# Patient Record
Sex: FEMALE | Race: WHITE | NOT HISPANIC OR LATINO | Employment: FULL TIME | URBAN - METROPOLITAN AREA
[De-identification: names, ages, dates, MRNs, and addresses within clinical notes are randomized per-mention and may not be internally consistent; named-entity substitution may affect disease eponyms.]

---

## 2023-09-18 PROBLEM — F41.8 DEPRESSION WITH ANXIETY: Status: ACTIVE | Noted: 2023-09-18

## 2023-09-18 PROBLEM — N80.9 ENDOMETRIOSIS: Status: ACTIVE | Noted: 2023-09-18

## 2023-09-18 PROBLEM — L28.2 PRURITIC RASH: Status: ACTIVE | Noted: 2023-09-18

## 2023-09-18 PROBLEM — R55 VASOVAGAL EPISODE: Status: ACTIVE | Noted: 2023-09-18

## 2023-09-18 PROBLEM — N12 PYELONEPHRITIS: Status: ACTIVE | Noted: 2023-09-18

## 2023-09-18 PROBLEM — N91.5 OLIGOMENORRHEA: Status: ACTIVE | Noted: 2023-09-18

## 2023-09-18 PROBLEM — T14.90XA TRAUMA: Status: ACTIVE | Noted: 2023-09-18

## 2023-09-18 RX ORDER — IBUPROFEN 600 MG/1
TABLET ORAL EVERY 6 HOURS PRN
COMMUNITY
Start: 2022-07-16 | End: 2023-10-26 | Stop reason: WASHOUT

## 2023-09-18 RX ORDER — SERTRALINE HYDROCHLORIDE 50 MG/1
1 TABLET, FILM COATED ORAL DAILY
COMMUNITY
Start: 2022-08-04 | End: 2023-10-26 | Stop reason: WASHOUT

## 2023-09-18 RX ORDER — SULFAMETHOXAZOLE AND TRIMETHOPRIM 800; 160 MG/1; MG/1
TABLET ORAL 2 TIMES DAILY
COMMUNITY
Start: 2022-08-22 | End: 2023-10-26 | Stop reason: WASHOUT

## 2023-09-18 RX ORDER — DROSPIRENONE 4 MG/1
TABLET, FILM COATED ORAL
COMMUNITY
Start: 2022-07-16 | End: 2023-10-26 | Stop reason: ALTCHOICE

## 2023-09-18 RX ORDER — DOCUSATE SODIUM 100 MG/1
CAPSULE, LIQUID FILLED ORAL 2 TIMES DAILY PRN
COMMUNITY
Start: 2022-07-16 | End: 2023-10-26 | Stop reason: WASHOUT

## 2023-09-18 RX ORDER — DROSPIRENONE 4 MG/1
TABLET, FILM COATED ORAL
COMMUNITY
Start: 2022-09-14 | End: 2023-10-26 | Stop reason: ALTCHOICE

## 2023-09-18 RX ORDER — ACETAMINOPHEN 325 MG/1
TABLET ORAL EVERY 6 HOURS PRN
COMMUNITY
Start: 2022-07-16 | End: 2023-10-26 | Stop reason: WASHOUT

## 2023-10-26 ENCOUNTER — APPOINTMENT (OUTPATIENT)
Dept: OBSTETRICS AND GYNECOLOGY | Facility: CLINIC | Age: 33
End: 2023-10-26
Payer: COMMERCIAL

## 2023-10-26 ENCOUNTER — OFFICE VISIT (OUTPATIENT)
Dept: OBSTETRICS AND GYNECOLOGY | Facility: CLINIC | Age: 33
End: 2023-10-26
Payer: COMMERCIAL

## 2023-10-26 VITALS
WEIGHT: 156 LBS | BODY MASS INDEX: 25.99 KG/M2 | DIASTOLIC BLOOD PRESSURE: 70 MMHG | SYSTOLIC BLOOD PRESSURE: 118 MMHG | HEIGHT: 65 IN

## 2023-10-26 DIAGNOSIS — Z01.419 WELL WOMAN EXAM WITH ROUTINE GYNECOLOGICAL EXAM: Primary | ICD-10-CM

## 2023-10-26 DIAGNOSIS — N93.9 ABNORMAL UTERINE BLEEDING (AUB): ICD-10-CM

## 2023-10-26 PROCEDURE — 1036F TOBACCO NON-USER: CPT | Performed by: OBSTETRICS & GYNECOLOGY

## 2023-10-26 PROCEDURE — 99395 PREV VISIT EST AGE 18-39: CPT | Performed by: OBSTETRICS & GYNECOLOGY

## 2023-10-26 RX ORDER — SERTRALINE HYDROCHLORIDE 100 MG/1
100 TABLET, FILM COATED ORAL DAILY
COMMUNITY
Start: 2023-10-03 | End: 2023-11-07

## 2023-10-26 RX ORDER — DROSPIRENONE 4 MG/1
TABLET, FILM COATED ORAL
Qty: 66 TABLET | Refills: 4 | Status: SHIPPED | OUTPATIENT
Start: 2023-10-26

## 2023-10-26 ASSESSMENT — PAIN SCALES - GENERAL: PAINLEVEL: 0-NO PAIN
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
## 2023-11-06 ENCOUNTER — APPOINTMENT (OUTPATIENT)
Dept: RADIOLOGY | Facility: HOSPITAL | Age: 33
End: 2023-11-06
Payer: COMMERCIAL

## 2023-11-06 ENCOUNTER — HOSPITAL ENCOUNTER (EMERGENCY)
Facility: HOSPITAL | Age: 33
Discharge: HOME | End: 2023-11-06
Attending: INTERNAL MEDICINE
Payer: COMMERCIAL

## 2023-11-06 VITALS
OXYGEN SATURATION: 99 % | HEART RATE: 83 BPM | BODY MASS INDEX: 23.95 KG/M2 | DIASTOLIC BLOOD PRESSURE: 85 MMHG | SYSTOLIC BLOOD PRESSURE: 116 MMHG | TEMPERATURE: 97.6 F | RESPIRATION RATE: 19 BRPM | HEIGHT: 66 IN | WEIGHT: 149 LBS

## 2023-11-06 DIAGNOSIS — G43.109 MIGRAINE WITH AURA AND WITHOUT STATUS MIGRAINOSUS, NOT INTRACTABLE: ICD-10-CM

## 2023-11-06 DIAGNOSIS — G45.9 TIA (TRANSIENT ISCHEMIC ATTACK): Primary | ICD-10-CM

## 2023-11-06 LAB
ALBUMIN SERPL BCP-MCNC: 4.4 G/DL (ref 3.4–5)
ALP SERPL-CCNC: 54 U/L (ref 33–110)
ALT SERPL W P-5'-P-CCNC: 13 U/L (ref 7–45)
ANION GAP SERPL CALC-SCNC: 14 MMOL/L (ref 10–20)
APTT PPP: 31 SECONDS (ref 27–38)
AST SERPL W P-5'-P-CCNC: 15 U/L (ref 9–39)
BASOPHILS # BLD AUTO: 0.05 X10*3/UL (ref 0–0.1)
BASOPHILS NFR BLD AUTO: 0.6 %
BILIRUB SERPL-MCNC: 0.4 MG/DL (ref 0–1.2)
BUN SERPL-MCNC: 12 MG/DL (ref 6–23)
CALCIUM SERPL-MCNC: 9.7 MG/DL (ref 8.6–10.3)
CARDIAC TROPONIN I PNL SERPL HS: <3 NG/L (ref 0–13)
CHLORIDE SERPL-SCNC: 106 MMOL/L (ref 98–107)
CO2 SERPL-SCNC: 24 MMOL/L (ref 21–32)
CREAT SERPL-MCNC: 0.97 MG/DL (ref 0.5–1.05)
EOSINOPHIL # BLD AUTO: 0.11 X10*3/UL (ref 0–0.7)
EOSINOPHIL NFR BLD AUTO: 1.3 %
ERYTHROCYTE [DISTWIDTH] IN BLOOD BY AUTOMATED COUNT: 12.9 % (ref 11.5–14.5)
GFR SERPL CREATININE-BSD FRML MDRD: 79 ML/MIN/1.73M*2
GLUCOSE BLD MANUAL STRIP-MCNC: 108 MG/DL (ref 74–99)
GLUCOSE BLD MANUAL STRIP-MCNC: 72 MG/DL (ref 74–99)
GLUCOSE SERPL-MCNC: 83 MG/DL (ref 74–99)
HCT VFR BLD AUTO: 46.1 % (ref 36–46)
HGB BLD-MCNC: 14.9 G/DL (ref 12–16)
IMM GRANULOCYTES # BLD AUTO: 0.01 X10*3/UL (ref 0–0.7)
IMM GRANULOCYTES NFR BLD AUTO: 0.1 % (ref 0–0.9)
INR PPP: 1.1 (ref 0.9–1.1)
LYMPHOCYTES # BLD AUTO: 3.94 X10*3/UL (ref 1.2–4.8)
LYMPHOCYTES NFR BLD AUTO: 47.4 %
MCH RBC QN AUTO: 27.4 PG (ref 26–34)
MCHC RBC AUTO-ENTMCNC: 32.3 G/DL (ref 32–36)
MCV RBC AUTO: 85 FL (ref 80–100)
MONOCYTES # BLD AUTO: 0.47 X10*3/UL (ref 0.1–1)
MONOCYTES NFR BLD AUTO: 5.6 %
NEUTROPHILS # BLD AUTO: 3.74 X10*3/UL (ref 1.2–7.7)
NEUTROPHILS NFR BLD AUTO: 45 %
NRBC BLD-RTO: 0 /100 WBCS (ref 0–0)
PLATELET # BLD AUTO: 239 X10*3/UL (ref 150–450)
POCT GLUCOSE: 72 MG/DL (ref 74–99)
POTASSIUM SERPL-SCNC: 3.8 MMOL/L (ref 3.5–5.3)
PROT SERPL-MCNC: 7.7 G/DL (ref 6.4–8.2)
PROTHROMBIN TIME: 12 SECONDS (ref 9.8–12.8)
RBC # BLD AUTO: 5.43 X10*6/UL (ref 4–5.2)
SODIUM SERPL-SCNC: 140 MMOL/L (ref 136–145)
WBC # BLD AUTO: 8.3 X10*3/UL (ref 4.4–11.3)

## 2023-11-06 PROCEDURE — 70498 CT ANGIOGRAPHY NECK: CPT | Performed by: RADIOLOGY

## 2023-11-06 PROCEDURE — 84484 ASSAY OF TROPONIN QUANT: CPT | Performed by: INTERNAL MEDICINE

## 2023-11-06 PROCEDURE — 99285 EMERGENCY DEPT VISIT HI MDM: CPT | Mod: 25 | Performed by: INTERNAL MEDICINE

## 2023-11-06 PROCEDURE — 85730 THROMBOPLASTIN TIME PARTIAL: CPT | Performed by: INTERNAL MEDICINE

## 2023-11-06 PROCEDURE — 80053 COMPREHEN METABOLIC PANEL: CPT | Performed by: INTERNAL MEDICINE

## 2023-11-06 PROCEDURE — 2550000001 HC RX 255 CONTRASTS: Performed by: INTERNAL MEDICINE

## 2023-11-06 PROCEDURE — 70498 CT ANGIOGRAPHY NECK: CPT

## 2023-11-06 PROCEDURE — 70496 CT ANGIOGRAPHY HEAD: CPT | Performed by: RADIOLOGY

## 2023-11-06 PROCEDURE — 36415 COLL VENOUS BLD VENIPUNCTURE: CPT | Performed by: INTERNAL MEDICINE

## 2023-11-06 PROCEDURE — 70450 CT HEAD/BRAIN W/O DYE: CPT

## 2023-11-06 PROCEDURE — 85610 PROTHROMBIN TIME: CPT | Performed by: INTERNAL MEDICINE

## 2023-11-06 PROCEDURE — 85025 COMPLETE CBC W/AUTO DIFF WBC: CPT | Performed by: INTERNAL MEDICINE

## 2023-11-06 PROCEDURE — 70496 CT ANGIOGRAPHY HEAD: CPT

## 2023-11-06 PROCEDURE — 82947 ASSAY GLUCOSE BLOOD QUANT: CPT | Mod: 59

## 2023-11-06 RX ORDER — NAPROXEN SODIUM 220 MG/1
81 TABLET, FILM COATED ORAL DAILY
Qty: 360 TABLET | Refills: 0 | Status: SHIPPED | OUTPATIENT
Start: 2023-11-06 | End: 2023-11-06 | Stop reason: SDUPTHER

## 2023-11-06 RX ORDER — NAPROXEN SODIUM 220 MG/1
81 TABLET, FILM COATED ORAL DAILY
Qty: 360 TABLET | Refills: 0 | Status: SHIPPED | OUTPATIENT
Start: 2023-11-06 | End: 2024-02-21 | Stop reason: WASHOUT

## 2023-11-06 RX ADMIN — IOHEXOL 75 ML: 350 INJECTION, SOLUTION INTRAVENOUS at 21:17

## 2023-11-06 ASSESSMENT — ABCD2 SCORE
HISTORY OF DIABETES: NO
BP IS HIGHER THAN 140/90 MMHG: NO
ABCD2 SCORE: 3
CLINICAL FEATURES OF THE TIA: UNILATERAL WEAKNESS
AGE IS GREATER THAN OR EQUAL TO 60: NO
DURATION OF SYMPTOMS: 10-59

## 2023-11-06 ASSESSMENT — COLUMBIA-SUICIDE SEVERITY RATING SCALE - C-SSRS
6. HAVE YOU EVER DONE ANYTHING, STARTED TO DO ANYTHING, OR PREPARED TO DO ANYTHING TO END YOUR LIFE?: NO
2. HAVE YOU ACTUALLY HAD ANY THOUGHTS OF KILLING YOURSELF?: NO
1. IN THE PAST MONTH, HAVE YOU WISHED YOU WERE DEAD OR WISHED YOU COULD GO TO SLEEP AND NOT WAKE UP?: NO

## 2023-11-06 ASSESSMENT — PAIN SCALES - GENERAL
PAINLEVEL_OUTOF10: 0 - NO PAIN
PAINLEVEL_OUTOF10: 2

## 2023-11-06 ASSESSMENT — PAIN - FUNCTIONAL ASSESSMENT: PAIN_FUNCTIONAL_ASSESSMENT: 0-10

## 2023-11-07 NOTE — ED TRIAGE NOTES
Patient ambulatory to ED with complaint of headache that started around 1200 today. Patient states she developed slurred speech/aphasia, left sided blurred vision and left sided numbness that lasted for approx 15 minutes and a second episode that lasted around 5 minutes at urgent care that has all since resolved. Denies migraine/stroke hx. States headache was resolved with Advil and Tylenol.

## 2023-11-07 NOTE — ED PROVIDER NOTES
HPI     CC: Headache (resolved) and Numbness (resolved)     Last Known Well Time: 1200    HPI: Katie Cisse is a 33 y.o. female with a history of anxiety/depression, headaches, presents as an acute stroke activation in triage.  Patient states that she developed a bad headache around noon today.  She took some meds and felt better but about an hour later she developed a [place] in her left eye with fuzzy edges that grew in size, lasting about 15 minutes.  Later in the car, she developed numbness on the left side, and her toes, lip, and hand radiating up her arm and leg.  She found it hard to form words.  This lasted less than 15 minutes and again resolved.  She went to urgent care and had a similar feeling like it was difficult to form words, again resolving within 5 minutes.  She did not have a hard time understanding or thinking of the right words, just had a hard time getting them out.  She does have a history of headaches for which she is seen a chiropractor for cervical spine manipulation in the past, most recently 2 years ago.  She has not really had headaches since then.  She has never had neurologic symptoms such as this.  She denies any neck pain, fever, chills, or other symptoms.  She has been having some frontal headaches over the past few days which she attributed to sinuses.  She is completely asymptomatic at this time.    ROS: 10-point review of systems was performed and is otherwise negative except as noted in HPI.    Limitations to history: N/A    Independent Historians: N/A    External Records Reviewed: N/A    Past Medical History: Noncontributory except per HPI     Past Surgical History: Noncontributory except per HPI     Family History: Reviewed and noncontributory     Social History:  Denies tobacco. Denies ETOH. Denies illicit drugs.    Social Determinants Affecting Care: N/A    Allergies   Allergen Reactions    Vancomycin Unknown, Other and Rash     Angelica Syndrome    Azithromycin Other     "Codeine Nausea Only and Other    Penicillins Rash    Sulfamethoxazole-Trimethoprim Itching and Rash       Home Meds:   Current Outpatient Medications   Medication Instructions    aspirin 81 mg, oral, Daily    drospirenone, contraceptive, (Slynd) 4 mg (28) tablet Take one tab daily and skip the placebo pills.    sertraline (ZOLOFT) 100 mg, oral, Daily        Physical Exam     ED Triage Vitals [11/06/23 2050]   Temp Heart Rate Resp BP   36.4 °C (97.6 °F) 91 18 119/84      SpO2 Temp src Heart Rate Source Patient Position   100 % -- -- --      BP Location FiO2 (%)     -- --         Heart Rate:  []   Temp:  [36.4 °C (97.6 °F)]   Resp:  [15-28]   BP: (116-119)/(83-86)   Height:  [167.6 cm (5' 6\")]   Weight:  [67.6 kg (149 lb)]   SpO2:  [96 %-100 %]      Physical Exam  Vitals and nursing note reviewed.     CONSTITUTIONAL: Well appearing, well nourished, in no acute distress.   HENMT: Head atraumatic. Airway patent. Nasal mucosa clear. Mouth with normal mucosa, clear oropharynx. Uvula midline. Neck supple.    EYES: Clear bilaterally, pupils equally round and reactive to light.   CARDIOVASCULAR: Normal rate, regular rhythm.  Heart sounds S1, S2.  No murmurs, rubs or gallops. Normal pulses. Capillary refill < 2 sec.   RESPIRATORY: No increased work of breathing. Breath sounds clear and equal bilaterally.  GASTROINTESTINAL: Abdomen soft, non-distended, non-tender. No rebound, no guarding. Normal bowel sounds. No palpable masses.  GENITOURINARY:  No CVA tenderness.  MUSCULOSKELETAL: Spine appears normal, range of motion is not limited, no muscle or joint tenderness. No edema.   NEUROLOGICAL: AAO x3. CN II-XII intact. 5/5 strength and SILT UEs/LEs. FTN intact. See full NIHSS below.  SKIN: Warm, dry and intact. No rash or notable lesions.  PSYCHIATRIC: Normal mood and affect.  HEME/LYMPH: No adenopathy or splenomegaly.    Interval: Baseline  Time: 11:33 PM  Person Administering Scale: Melinda Henderson MD     1a  Level of " consciousness: 0=alert; keenly responsive   1b. LOC questions:  0=Performs both tasks correctly   1c. LOC commands: 0=Performs both tasks correctly   2.  Best Gaze: 0=normal   3. Visual: 0=No visual loss   4. Facial Palsy: 0=Normal symmetric movement   5a. Motor left arm: 0=No drift, limb holds 90 (or 45) degrees for full 10 seconds   5b.  Motor right arm: 0=No drift, limb holds 90 (or 45) degrees for full 10 seconds   6a. motor left le=No drift, limb holds 90 (or 45) degrees for full 10 seconds   6b  Motor right le=No drift, limb holds 90 (or 45) degrees for full 10 seconds   7. Limb Ataxia: 0=Absent   8.  Sensory: 0=Normal; no sensory loss   9. Best Language:  0=No aphasia, normal   10. Dysarthria: 0=Normal   11. Extinction and Inattention: 0=No abnormality   12. Distal motor function: 0=Normal     Total:   0       VAN: VAN: Negative    Diagnostic Results      ECG: ECGs read and interpreted by me. See ED Course, below, for interpretation.    Labs Reviewed   CBC WITH AUTO DIFFERENTIAL - Abnormal       Result Value    WBC 8.3      nRBC 0.0      RBC 5.43 (*)     Hemoglobin 14.9      Hematocrit 46.1 (*)     MCV 85      MCH 27.4      MCHC 32.3      RDW 12.9      Platelets 239      Neutrophils % 45.0      Immature Granulocytes %, Automated 0.1      Lymphocytes % 47.4      Monocytes % 5.6      Eosinophils % 1.3      Basophils % 0.6      Neutrophils Absolute 3.74      Immature Granulocytes Absolute, Automated 0.01      Lymphocytes Absolute 3.94      Monocytes Absolute 0.47      Eosinophils Absolute 0.11      Basophils Absolute 0.05     POCT GLUCOSE - Abnormal    POCT Glucose 72 (*)    POCT GLUCOSE - Abnormal    POCT Glucose 108 (*)    POCT GLUCOSE METER - Abnormal    POCT Glucose 72 (*)    COMPREHENSIVE METABOLIC PANEL - Normal    Glucose 83      Sodium 140      Potassium 3.8      Chloride 106      Bicarbonate 24      Anion Gap 14      Urea Nitrogen 12      Creatinine 0.97      eGFR 79      Calcium 9.7       Albumin 4.4      Alkaline Phosphatase 54      Total Protein 7.7      AST 15      Bilirubin, Total 0.4      ALT 13     TROPONIN I, HIGH SENSITIVITY - Normal    Troponin I, High Sensitivity <3      Narrative:     Less than 99th percentile of normal range cutoff-  Female and children under 18 years old <14 ng/L; Male <21 ng/L: Negative  Repeat testing should be performed if clinically indicated.     Female and children under 18 years old 14-50 ng/L; Male 21-50 ng/L:  Consistent with possible cardiac damage and possible increased clinical   risk. Serial measurements may help to assess extent of myocardial damage.     >50 ng/L: Consistent with cardiac damage, increased clinical risk and  myocardial infarction. Serial measurements may help assess extent of   myocardial damage.      NOTE: Children less than 1 year old may have higher baseline troponin   levels and results should be interpreted in conjunction with the overall   clinical context.     NOTE: Troponin I testing is performed using a different   testing methodology at [facility] than at other   [facility]. Direct result comparisons should only   be made within the same method.   PROTIME-INR - Normal    Protime 12.0      INR 1.1     APTT - Normal    aPTT 31      Narrative:     The APTT is no longer used for monitoring Unfractionated Heparin Therapy. For monitoring Heparin Therapy, use the Heparin Assay.   POCT GLUCOSE METER         CT angio brain attack head w IV contrast and post procedure   Final Result   No evidence for significant stenosis of the cervical vessels.        No evidence for significant stenosis or large branch vessel cutoffs   of the intracranial vessels.        MACRO:   None.        Signed by: Hilaria Poon 11/6/2023 9:35 PM   Dictation workstation:   [id number]      CT angio brain attack neck w IV contrast and post procedure   Final Result   No evidence for significant stenosis of the cervical vessels.        No evidence  for significant stenosis or large branch vessel cutoffs   of the intracranial vessels.        MACRO:   None.        Signed by: Hilaria Poon 11/6/2023 9:35 PM   Dictation workstation:   [id number]      CT brain attack head wo IV contrast   Final Result   No acute intracranial hemorrhage or mass effect. MRI may be   considered as clinically indicated for further evaluation ischemic   infarct.             MACRO:   Hilaria Poon discussed the significance and urgency of this   critical finding by telephone with  SAMPSON MOSES on 11/6/2023 at   9:17 pm.  (**-RCF-**) Findings:  See findings.        Signed by: Hilaria Poon 11/6/2023 9:17 PM   Dictation workstation:   [id number]          ABCD2 Score: 3         [place] Coma Scale Score: 15         NIH Stroke Scale: 0          Procedure  Procedures    ED Course & MDM   Assessment/Plan:   Katie Cisse is a 33 y.o. female with a history of anxiety/depression, headaches, presents as an acute stroke activation in triage for headache with speech difficulty, scotoma, and left-sided paresthesias that have now resolved.  ABCD2 score 3.  Presentation concerning for possible complex migraine versus TIA.  She does have a history of cervical manipulation by a chiropractor but not recently.  Work-up was initiated with labs, CT/CTA.    See below for details of ED course and ultimate disposition.    Medications   iohexol (OMNIPaque) 350 mg iodine/mL solution 75 mL (75 mL intravenous Given 11/6/23 2117)        ED Course as of 11/06/23 2333   Mon Nov 06, 2023 2140 ECG read and interpreted by me.  Normal sinus rhythm, rate 83.  Normal axis.  Normal intervals.  No ST or T wave derangements. [CG]   2140 Per radiology no obvious abnormality on CT.  CTA to be reviewed. [CG]   2212 CTA No evidence for significant stenosis of the cervical vessels.      No evidence for significant stenosis or large branch vessel cutoffs  of the intracranial vessels.   [CG]   2212 CTH No acute  intracranial hemorrhage or mass effect. MRI may be  considered as clinically indicated for further evaluation ischemic  infarct.   [CG]   2212 Labs are notable for CBC with normal WBC 8.3, normal H&H, normal platelets, normal CMP, normal coags, negative troponin, mildly low glucose 72. [CG]   2305 Updated findings with patient.  Offered admission for MRI and further work-up but patient has declined.  She has been asymptomatic since  being in the ED for several hours. Per TIA CPG, with low risk ABCD 2 score 3, patient can be discharged with close PCP and neurology follow-up.  Rx provided for aspirin 81 mg and email placed to acute neurology follow-up team.  [CG]   2327 Repeat glucose 108. Hypoglycemia could potentially explain some of her symptoms although glucose of 72 generally unremarkable in this otherwise healthy patient. [CG]      ED Course User Index  [CG] Melinda Henderson MD         Diagnoses as of 11/06/23 2333   TIA (transient ischemic attack)   Migraine with aura and without status migrainosus, not intractable       IV Thrombolysis:    No Thrombolysis contraindication reason: Neurologic signs have spontaneously resolved     Disposition:   DISCHARGE.  The patient was discharged with both verbal and written anticipatory guidance and strict return precautions. Discharge diagnosis, instructions and plan were discussed and understood. I emphasized the importance of following up with their primary care provider within 24-48 hours and with any referrals in the timeframe recommended. At the time of discharge the patient was comfortable and was in no apparent distress. Patient is aware of diagnostic uncertainty and was notified though testing is negative here, there is a very small chance that pathology may be missed.  The patient understands these risks and the patient/family understood to call 911 or return immediately to the emergency department if the symptoms worsen or if they have any additional concerns.       FOLLOW UP  Primary care provider in 1-2 days.      ED Prescriptions       Medication Sig Dispense Start Date End Date Auth. Provider    aspirin 81 mg chewable tablet  (Status: Discontinued) Chew 1 tablet (81 mg) once daily. 360 tablet 11/6/2023 11/6/2023 Melinda Henderson MD    aspirin 81 mg chewable tablet Chew 1 tablet (81 mg) once daily. 360 tablet 11/6/2023 11/5/2024 MD Melinda Samuel MD  EM/IM/Peds    This note was dictated by speech recognition. Minor errors in transcription may be present.     Melinda Henderson MD  11/06/23 7045

## 2023-11-07 NOTE — DISCHARGE INSTRUCTIONS
You were seen today for headache and neurologic symptoms.  You likely have a complex migraine or a TIA.  We offered you admission but you would prefer to be discharged with close follow-up.  Please follow-up with your PCP within 24 to 48 hours.  We sent a referral to the acute neurology follow-up clinic.  They should be reaching out to you for an appointment.  We will start you on a baby aspirin.  Your evaluation was not concerning for an emergency at this time. Please see the attached information sheet for information about your condition, how to care for your condition at home, and reasons to return to the emergency department. Take any prescriptions written today as prescribed. You should call your primary care provider within 24 hours to tell them about today's visit, including any new medications or medication changes, as he or she may want to see you in the office for further evaluation. If you do not have a primary care provider, call  [phone] for an appointment. We offer in-person office visits as well as virtual options. Please do not hesitate to call  [phone] or return to the emergency department with any new or unresolved concerns or symptoms. Thank you for choosing [facility] for your care.

## 2023-11-10 ENCOUNTER — OFFICE VISIT (OUTPATIENT)
Dept: PRIMARY CARE | Facility: CLINIC | Age: 33
End: 2023-11-10
Payer: COMMERCIAL

## 2023-11-10 ENCOUNTER — APPOINTMENT (OUTPATIENT)
Dept: RADIOLOGY | Facility: CLINIC | Age: 33
End: 2023-11-10
Payer: COMMERCIAL

## 2023-11-10 VITALS
BODY MASS INDEX: 24.75 KG/M2 | DIASTOLIC BLOOD PRESSURE: 70 MMHG | WEIGHT: 154 LBS | HEIGHT: 66 IN | OXYGEN SATURATION: 100 % | HEART RATE: 68 BPM | SYSTOLIC BLOOD PRESSURE: 118 MMHG

## 2023-11-10 DIAGNOSIS — R51.9 NONINTRACTABLE HEADACHE, UNSPECIFIED CHRONICITY PATTERN, UNSPECIFIED HEADACHE TYPE: Primary | ICD-10-CM

## 2023-11-10 PROCEDURE — 99214 OFFICE O/P EST MOD 30 MIN: CPT | Performed by: FAMILY MEDICINE

## 2023-11-10 PROCEDURE — 1036F TOBACCO NON-USER: CPT | Performed by: FAMILY MEDICINE

## 2023-11-10 NOTE — PROGRESS NOTES
"Subjective   Patient ID: Katie Cisse is a 33 y.o. female who presents for ER follow up stroke symptoms.      Pt presents for ED follow up:     Was seen in ED 11/6   Referred to Neuro, appt in late Jan   PGF: TIA   Paunt: CVA in her 40s    Left fingers, toes, lips and tongue and then trouble forming words  She knows the words she wants to say and has trouble getting them out, although it is improving daily  Feels brain fog and confusion  She has recently started intermittent fasting - 4 weeks ago         Caffeine : 2 cans Diet   16 month old daughter is not sleeping well x 2 months            Review of Systems    Objective   /70   Pulse 68   Ht 1.676 m (5' 6\")   Wt 69.9 kg (154 lb)   LMP  (LMP Unknown) Comment: Pt on Slynd  SpO2 100%   BMI 24.86 kg/m²     Physical Exam  Vitals and nursing note reviewed.   Constitutional:       Appearance: Normal appearance.   Cardiovascular:      Rate and Rhythm: Normal rate and regular rhythm.      Heart sounds: Normal heart sounds.   Pulmonary:      Effort: Pulmonary effort is normal.      Breath sounds: Normal breath sounds.   Skin:     General: Skin is warm and dry.   Neurological:      General: No focal deficit present.      Mental Status: She is alert and oriented to person, place, and time. Mental status is at baseline.      Cranial Nerves: Cranial nerves 2-12 are intact. No cranial nerve deficit.      Sensory: No sensory deficit.      Motor: Motor function is intact. No weakness or pronator drift.      Coordination: Coordination is intact. Coordination normal.      Gait: Gait normal.      Deep Tendon Reflexes: Reflexes normal.      Comments: Normal finger to nose testing  Normal rapid alternating hand movements bilaterally     Psychiatric:         Mood and Affect: Mood normal.         Assessment/Plan   Diagnoses and all orders for this visit:  Nonintractable headache, unspecified chronicity pattern, unspecified headache type  -     MR brain wo IV " Multifactorial and related to underlying COPD, GERD, upper airway cough syndrome  Has had improvement since initiation of gabapentin  Plan  Continue COPD medications  Continue gabapentin 300 mg b i d  contrast; Future  -     MR angio head wo IV contrast; Future    Reviewed ED note, TIA versus complex migraine  They recc daily ASA 81 mg until further dx, I discussed R/B/SE with the pt, recc she continue the ASA and monitor for any abnormal bleeding  Sent a note to neuro to see how soon can be seen for a follow up  Advised ED eval with any new or worsening of her symptoms      Julia Samuels, DO

## 2023-11-13 ENCOUNTER — APPOINTMENT (OUTPATIENT)
Dept: RADIOLOGY | Facility: HOSPITAL | Age: 33
End: 2023-11-13
Payer: COMMERCIAL

## 2023-11-13 ENCOUNTER — HOSPITAL ENCOUNTER (OUTPATIENT)
Facility: HOSPITAL | Age: 33
Setting detail: OBSERVATION
Discharge: HOME | End: 2023-11-14
Attending: EMERGENCY MEDICINE | Admitting: INTERNAL MEDICINE
Payer: COMMERCIAL

## 2023-11-13 DIAGNOSIS — G43.B0 OPHTHALMOPLEGIC MIGRAINE, NOT INTRACTABLE: Primary | ICD-10-CM

## 2023-11-13 DIAGNOSIS — G43.109 MIGRAINE WITH AURA AND WITHOUT STATUS MIGRAINOSUS, NOT INTRACTABLE: ICD-10-CM

## 2023-11-13 LAB
ALBUMIN SERPL BCP-MCNC: 4 G/DL (ref 3.4–5)
ALBUMIN SERPL BCP-MCNC: 4 G/DL (ref 3.4–5)
ALP SERPL-CCNC: 50 U/L (ref 33–110)
ALP SERPL-CCNC: 50 U/L (ref 33–110)
ALT SERPL W P-5'-P-CCNC: 13 U/L (ref 7–45)
ALT SERPL W P-5'-P-CCNC: 13 U/L (ref 7–45)
ANION GAP SERPL CALC-SCNC: 15 MMOL/L (ref 10–20)
ANION GAP SERPL CALC-SCNC: 15 MMOL/L (ref 10–20)
APPEARANCE UR: CLEAR
AST SERPL W P-5'-P-CCNC: 14 U/L (ref 9–39)
AST SERPL W P-5'-P-CCNC: 14 U/L (ref 9–39)
B-HCG SERPL-ACNC: <2 MIU/ML
BASOPHILS # BLD AUTO: 0.05 X10*3/UL (ref 0–0.1)
BASOPHILS NFR BLD AUTO: 0.5 %
BILIRUB DIRECT SERPL-MCNC: 0.1 MG/DL (ref 0–0.3)
BILIRUB SERPL-MCNC: 0.5 MG/DL (ref 0–1.2)
BILIRUB SERPL-MCNC: 0.5 MG/DL (ref 0–1.2)
BILIRUB UR STRIP.AUTO-MCNC: NEGATIVE MG/DL
BUN SERPL-MCNC: 11 MG/DL (ref 6–23)
BUN SERPL-MCNC: 11 MG/DL (ref 6–23)
CALCIUM SERPL-MCNC: 9.2 MG/DL (ref 8.6–10.3)
CALCIUM SERPL-MCNC: 9.2 MG/DL (ref 8.6–10.3)
CHLORIDE SERPL-SCNC: 105 MMOL/L (ref 98–107)
CHLORIDE SERPL-SCNC: 105 MMOL/L (ref 98–107)
CO2 SERPL-SCNC: 22 MMOL/L (ref 21–32)
CO2 SERPL-SCNC: 22 MMOL/L (ref 21–32)
COLOR UR: COLORLESS
CREAT SERPL-MCNC: 0.95 MG/DL (ref 0.5–1.05)
CREAT SERPL-MCNC: 0.95 MG/DL (ref 0.5–1.05)
CRP SERPL-MCNC: 0.16 MG/DL
EOSINOPHIL # BLD AUTO: 0.12 X10*3/UL (ref 0–0.7)
EOSINOPHIL NFR BLD AUTO: 1.3 %
ERYTHROCYTE [DISTWIDTH] IN BLOOD BY AUTOMATED COUNT: 12.9 % (ref 11.5–14.5)
ERYTHROCYTE [SEDIMENTATION RATE] IN BLOOD BY WESTERGREN METHOD: 12 MM/H (ref 0–20)
GFR SERPL CREATININE-BSD FRML MDRD: 81 ML/MIN/1.73M*2
GFR SERPL CREATININE-BSD FRML MDRD: 81 ML/MIN/1.73M*2
GLUCOSE SERPL-MCNC: 93 MG/DL (ref 74–99)
GLUCOSE SERPL-MCNC: 93 MG/DL (ref 74–99)
GLUCOSE UR STRIP.AUTO-MCNC: NEGATIVE MG/DL
HCG UR QL IA.RAPID: NEGATIVE
HCT VFR BLD AUTO: 43.1 % (ref 36–46)
HGB BLD-MCNC: 14.4 G/DL (ref 12–16)
IMM GRANULOCYTES # BLD AUTO: 0.01 X10*3/UL (ref 0–0.7)
IMM GRANULOCYTES NFR BLD AUTO: 0.1 % (ref 0–0.9)
KETONES UR STRIP.AUTO-MCNC: NEGATIVE MG/DL
LEUKOCYTE ESTERASE UR QL STRIP.AUTO: NEGATIVE
LYMPHOCYTES # BLD AUTO: 4.81 X10*3/UL (ref 1.2–4.8)
LYMPHOCYTES NFR BLD AUTO: 52.6 %
MAGNESIUM SERPL-MCNC: 2 MG/DL (ref 1.6–2.4)
MCH RBC QN AUTO: 27.6 PG (ref 26–34)
MCHC RBC AUTO-ENTMCNC: 33.4 G/DL (ref 32–36)
MCV RBC AUTO: 83 FL (ref 80–100)
MONOCYTES # BLD AUTO: 0.53 X10*3/UL (ref 0.1–1)
MONOCYTES NFR BLD AUTO: 5.8 %
NEUTROPHILS # BLD AUTO: 3.62 X10*3/UL (ref 1.2–7.7)
NEUTROPHILS NFR BLD AUTO: 39.7 %
NITRITE UR QL STRIP.AUTO: NEGATIVE
NRBC BLD-RTO: 0 /100 WBCS (ref 0–0)
PH UR STRIP.AUTO: 8 [PH]
PLATELET # BLD AUTO: 256 X10*3/UL (ref 150–450)
POTASSIUM SERPL-SCNC: 3.3 MMOL/L (ref 3.5–5.3)
POTASSIUM SERPL-SCNC: 3.3 MMOL/L (ref 3.5–5.3)
PROT SERPL-MCNC: 7.5 G/DL (ref 6.4–8.2)
PROT SERPL-MCNC: 7.5 G/DL (ref 6.4–8.2)
PROT UR STRIP.AUTO-MCNC: NEGATIVE MG/DL
RBC # BLD AUTO: 5.22 X10*6/UL (ref 4–5.2)
RBC # UR STRIP.AUTO: NEGATIVE /UL
SODIUM SERPL-SCNC: 139 MMOL/L (ref 136–145)
SODIUM SERPL-SCNC: 139 MMOL/L (ref 136–145)
SP GR UR STRIP.AUTO: 1
UROBILINOGEN UR STRIP.AUTO-MCNC: <2 MG/DL
WBC # BLD AUTO: 9.1 X10*3/UL (ref 4.4–11.3)

## 2023-11-13 PROCEDURE — 96365 THER/PROPH/DIAG IV INF INIT: CPT | Performed by: INTERNAL MEDICINE

## 2023-11-13 PROCEDURE — 81003 URINALYSIS AUTO W/O SCOPE: CPT | Performed by: EMERGENCY MEDICINE

## 2023-11-13 PROCEDURE — 84702 CHORIONIC GONADOTROPIN TEST: CPT | Performed by: EMERGENCY MEDICINE

## 2023-11-13 PROCEDURE — G0378 HOSPITAL OBSERVATION PER HR: HCPCS

## 2023-11-13 PROCEDURE — 85652 RBC SED RATE AUTOMATED: CPT | Performed by: EMERGENCY MEDICINE

## 2023-11-13 PROCEDURE — 83735 ASSAY OF MAGNESIUM: CPT | Performed by: PHYSICIAN ASSISTANT

## 2023-11-13 PROCEDURE — 96375 TX/PRO/DX INJ NEW DRUG ADDON: CPT

## 2023-11-13 PROCEDURE — 96375 TX/PRO/DX INJ NEW DRUG ADDON: CPT | Performed by: INTERNAL MEDICINE

## 2023-11-13 PROCEDURE — 70551 MRI BRAIN STEM W/O DYE: CPT | Performed by: RADIOLOGY

## 2023-11-13 PROCEDURE — 99285 EMERGENCY DEPT VISIT HI MDM: CPT | Mod: 25 | Performed by: EMERGENCY MEDICINE

## 2023-11-13 PROCEDURE — 2500000004 HC RX 250 GENERAL PHARMACY W/ HCPCS (ALT 636 FOR OP/ED): Performed by: PHYSICIAN ASSISTANT

## 2023-11-13 PROCEDURE — 70551 MRI BRAIN STEM W/O DYE: CPT

## 2023-11-13 PROCEDURE — 99221 1ST HOSP IP/OBS SF/LOW 40: CPT | Performed by: PHYSICIAN ASSISTANT

## 2023-11-13 PROCEDURE — 82248 BILIRUBIN DIRECT: CPT | Performed by: EMERGENCY MEDICINE

## 2023-11-13 PROCEDURE — 86140 C-REACTIVE PROTEIN: CPT | Performed by: EMERGENCY MEDICINE

## 2023-11-13 PROCEDURE — 70544 MR ANGIOGRAPHY HEAD W/O DYE: CPT | Performed by: RADIOLOGY

## 2023-11-13 PROCEDURE — 36415 COLL VENOUS BLD VENIPUNCTURE: CPT | Performed by: EMERGENCY MEDICINE

## 2023-11-13 PROCEDURE — 85025 COMPLETE CBC W/AUTO DIFF WBC: CPT | Performed by: EMERGENCY MEDICINE

## 2023-11-13 PROCEDURE — 81025 URINE PREGNANCY TEST: CPT | Performed by: EMERGENCY MEDICINE

## 2023-11-13 PROCEDURE — 80053 COMPREHEN METABOLIC PANEL: CPT | Performed by: EMERGENCY MEDICINE

## 2023-11-13 PROCEDURE — 70544 MR ANGIOGRAPHY HEAD W/O DYE: CPT

## 2023-11-13 PROCEDURE — 2500000004 HC RX 250 GENERAL PHARMACY W/ HCPCS (ALT 636 FOR OP/ED): Performed by: EMERGENCY MEDICINE

## 2023-11-13 RX ORDER — DOCUSATE SODIUM 100 MG/1
100 CAPSULE, LIQUID FILLED ORAL 2 TIMES DAILY PRN
Status: DISCONTINUED | OUTPATIENT
Start: 2023-11-13 | End: 2023-11-14 | Stop reason: HOSPADM

## 2023-11-13 RX ORDER — PANTOPRAZOLE SODIUM 40 MG/10ML
40 INJECTION, POWDER, LYOPHILIZED, FOR SOLUTION INTRAVENOUS
Status: DISCONTINUED | OUTPATIENT
Start: 2023-11-14 | End: 2023-11-14 | Stop reason: HOSPADM

## 2023-11-13 RX ORDER — MAGNESIUM SULFATE 1 G/100ML
1 INJECTION INTRAVENOUS ONCE
Status: COMPLETED | OUTPATIENT
Start: 2023-11-13 | End: 2023-11-14

## 2023-11-13 RX ORDER — KETOROLAC TROMETHAMINE 30 MG/ML
30 INJECTION, SOLUTION INTRAMUSCULAR; INTRAVENOUS ONCE
Status: COMPLETED | OUTPATIENT
Start: 2023-11-13 | End: 2023-11-13

## 2023-11-13 RX ORDER — METOCLOPRAMIDE HYDROCHLORIDE 5 MG/ML
10 INJECTION INTRAMUSCULAR; INTRAVENOUS ONCE
Status: COMPLETED | OUTPATIENT
Start: 2023-11-13 | End: 2023-11-13

## 2023-11-13 RX ORDER — SERTRALINE HYDROCHLORIDE 50 MG/1
100 TABLET, FILM COATED ORAL NIGHTLY
Status: DISCONTINUED | OUTPATIENT
Start: 2023-11-13 | End: 2023-11-14 | Stop reason: HOSPADM

## 2023-11-13 RX ORDER — DIPHENHYDRAMINE HYDROCHLORIDE 50 MG/ML
25 INJECTION INTRAMUSCULAR; INTRAVENOUS ONCE
Status: COMPLETED | OUTPATIENT
Start: 2023-11-13 | End: 2023-11-13

## 2023-11-13 RX ORDER — ACETAMINOPHEN 325 MG/1
950 TABLET ORAL EVERY 6 HOURS PRN
Status: DISCONTINUED | OUTPATIENT
Start: 2023-11-13 | End: 2023-11-14 | Stop reason: HOSPADM

## 2023-11-13 RX ORDER — TALC
3 POWDER (GRAM) TOPICAL
Status: DISCONTINUED | OUTPATIENT
Start: 2023-11-14 | End: 2023-11-14 | Stop reason: HOSPADM

## 2023-11-13 RX ORDER — HYDROXYZINE HYDROCHLORIDE 10 MG/1
10 TABLET, FILM COATED ORAL EVERY 8 HOURS PRN
Status: DISCONTINUED | OUTPATIENT
Start: 2023-11-13 | End: 2023-11-14 | Stop reason: HOSPADM

## 2023-11-13 RX ORDER — POTASSIUM CHLORIDE 20 MEQ/1
40 TABLET, EXTENDED RELEASE ORAL ONCE
Status: COMPLETED | OUTPATIENT
Start: 2023-11-13 | End: 2023-11-13

## 2023-11-13 RX ORDER — PANTOPRAZOLE SODIUM 40 MG/1
40 TABLET, DELAYED RELEASE ORAL
Status: DISCONTINUED | OUTPATIENT
Start: 2023-11-14 | End: 2023-11-14 | Stop reason: HOSPADM

## 2023-11-13 RX ORDER — HYDROXYZINE HYDROCHLORIDE 10 MG/1
10 TABLET, FILM COATED ORAL EVERY 8 HOURS PRN
COMMUNITY

## 2023-11-13 RX ORDER — PROCHLORPERAZINE EDISYLATE 5 MG/ML
10 INJECTION INTRAMUSCULAR; INTRAVENOUS ONCE
Status: COMPLETED | OUTPATIENT
Start: 2023-11-13 | End: 2023-11-13

## 2023-11-13 RX ADMIN — KETOROLAC TROMETHAMINE 30 MG: 30 INJECTION INTRAMUSCULAR; INTRAVENOUS at 17:40

## 2023-11-13 RX ADMIN — POTASSIUM CHLORIDE 40 MEQ: 1500 TABLET, EXTENDED RELEASE ORAL at 22:50

## 2023-11-13 RX ADMIN — METOCLOPRAMIDE 10 MG: 5 INJECTION, SOLUTION INTRAMUSCULAR; INTRAVENOUS at 22:50

## 2023-11-13 RX ADMIN — DIPHENHYDRAMINE HYDROCHLORIDE 25 MG: 50 INJECTION INTRAMUSCULAR; INTRAVENOUS at 18:05

## 2023-11-13 RX ADMIN — SODIUM CHLORIDE 1000 ML: 9 INJECTION, SOLUTION INTRAVENOUS at 22:50

## 2023-11-13 RX ADMIN — MAGNESIUM SULFATE HEPTAHYDRATE 1 G: 1 INJECTION, SOLUTION INTRAVENOUS at 23:01

## 2023-11-13 RX ADMIN — PROCHLORPERAZINE EDISYLATE 10 MG: 5 INJECTION INTRAMUSCULAR; INTRAVENOUS at 17:40

## 2023-11-13 SDOH — SOCIAL STABILITY: SOCIAL INSECURITY: WERE YOU ABLE TO COMPLETE ALL THE BEHAVIORAL HEALTH SCREENINGS?: YES

## 2023-11-13 SDOH — SOCIAL STABILITY: SOCIAL INSECURITY: HAVE YOU HAD THOUGHTS OF HARMING ANYONE ELSE?: NO

## 2023-11-13 ASSESSMENT — LIFESTYLE VARIABLES
SKIP TO QUESTIONS 9-10: 1
HOW MANY STANDARD DRINKS CONTAINING ALCOHOL DO YOU HAVE ON A TYPICAL DAY: 1 OR 2
AUDIT-C TOTAL SCORE: 1
HOW OFTEN DO YOU HAVE 6 OR MORE DRINKS ON ONE OCCASION: NEVER
AUDIT-C TOTAL SCORE: 1
HOW OFTEN DO YOU HAVE A DRINK CONTAINING ALCOHOL: MONTHLY OR LESS

## 2023-11-13 ASSESSMENT — COGNITIVE AND FUNCTIONAL STATUS - GENERAL
MOBILITY SCORE: 24
DAILY ACTIVITIY SCORE: 24
PATIENT BASELINE BEDBOUND: NO

## 2023-11-13 ASSESSMENT — PATIENT HEALTH QUESTIONNAIRE - PHQ9
1. LITTLE INTEREST OR PLEASURE IN DOING THINGS: NOT AT ALL
2. FEELING DOWN, DEPRESSED OR HOPELESS: NOT AT ALL
SUM OF ALL RESPONSES TO PHQ9 QUESTIONS 1 & 2: 0

## 2023-11-13 ASSESSMENT — ACTIVITIES OF DAILY LIVING (ADL)
WALKS IN HOME: INDEPENDENT
TOILETING: INDEPENDENT
FEEDING YOURSELF: INDEPENDENT
GROOMING: INDEPENDENT
DRESSING YOURSELF: INDEPENDENT
JUDGMENT_ADEQUATE_SAFELY_COMPLETE_DAILY_ACTIVITIES: YES
HEARING - RIGHT EAR: FUNCTIONAL
HEARING - LEFT EAR: FUNCTIONAL
LACK_OF_TRANSPORTATION: NO
BATHING: INDEPENDENT
ADEQUATE_TO_COMPLETE_ADL: YES
PATIENT'S MEMORY ADEQUATE TO SAFELY COMPLETE DAILY ACTIVITIES?: YES

## 2023-11-13 ASSESSMENT — PAIN DESCRIPTION - DESCRIPTORS: DESCRIPTORS: PRESSURE

## 2023-11-13 ASSESSMENT — PAIN - FUNCTIONAL ASSESSMENT: PAIN_FUNCTIONAL_ASSESSMENT: 0-10

## 2023-11-13 ASSESSMENT — PAIN SCALES - GENERAL
PAINLEVEL_OUTOF10: 6
PAINLEVEL_OUTOF10: 7

## 2023-11-13 ASSESSMENT — PAIN DESCRIPTION - LOCATION: LOCATION: HEAD

## 2023-11-13 NOTE — ED TRIAGE NOTES
Pt recently experiencing migraine headaches and she received an aura today then started having slurred speech.

## 2023-11-14 VITALS
RESPIRATION RATE: 17 BRPM | TEMPERATURE: 99.2 F | HEIGHT: 66 IN | HEART RATE: 104 BPM | DIASTOLIC BLOOD PRESSURE: 70 MMHG | OXYGEN SATURATION: 95 % | WEIGHT: 154 LBS | SYSTOLIC BLOOD PRESSURE: 111 MMHG | BODY MASS INDEX: 24.75 KG/M2

## 2023-11-14 LAB
ANION GAP SERPL CALC-SCNC: 13 MMOL/L (ref 10–20)
BUN SERPL-MCNC: 9 MG/DL (ref 6–23)
CALCIUM SERPL-MCNC: 8.8 MG/DL (ref 8.6–10.3)
CHLORIDE SERPL-SCNC: 108 MMOL/L (ref 98–107)
CO2 SERPL-SCNC: 23 MMOL/L (ref 21–32)
CREAT SERPL-MCNC: 0.91 MG/DL (ref 0.5–1.05)
ERYTHROCYTE [DISTWIDTH] IN BLOOD BY AUTOMATED COUNT: 12.9 % (ref 11.5–14.5)
GFR SERPL CREATININE-BSD FRML MDRD: 86 ML/MIN/1.73M*2
GLUCOSE SERPL-MCNC: 118 MG/DL (ref 74–99)
HCT VFR BLD AUTO: 43.3 % (ref 36–46)
HGB BLD-MCNC: 14.1 G/DL (ref 12–16)
MCH RBC QN AUTO: 27.8 PG (ref 26–34)
MCHC RBC AUTO-ENTMCNC: 32.6 G/DL (ref 32–36)
MCV RBC AUTO: 85 FL (ref 80–100)
NRBC BLD-RTO: 0 /100 WBCS (ref 0–0)
PLATELET # BLD AUTO: 266 X10*3/UL (ref 150–450)
POTASSIUM SERPL-SCNC: 4.4 MMOL/L (ref 3.5–5.3)
RBC # BLD AUTO: 5.08 X10*6/UL (ref 4–5.2)
SODIUM SERPL-SCNC: 140 MMOL/L (ref 136–145)
WBC # BLD AUTO: 11.1 X10*3/UL (ref 4.4–11.3)

## 2023-11-14 PROCEDURE — 2500000004 HC RX 250 GENERAL PHARMACY W/ HCPCS (ALT 636 FOR OP/ED): Performed by: PHYSICIAN ASSISTANT

## 2023-11-14 PROCEDURE — 85027 COMPLETE CBC AUTOMATED: CPT | Performed by: PHYSICIAN ASSISTANT

## 2023-11-14 PROCEDURE — 2500000005 HC RX 250 GENERAL PHARMACY W/O HCPCS: Performed by: PHYSICIAN ASSISTANT

## 2023-11-14 PROCEDURE — 96367 TX/PROPH/DG ADDL SEQ IV INF: CPT | Performed by: INTERNAL MEDICINE

## 2023-11-14 PROCEDURE — 99239 HOSP IP/OBS DSCHRG MGMT >30: CPT | Performed by: NURSE PRACTITIONER

## 2023-11-14 PROCEDURE — 36415 COLL VENOUS BLD VENIPUNCTURE: CPT | Performed by: PHYSICIAN ASSISTANT

## 2023-11-14 PROCEDURE — G0378 HOSPITAL OBSERVATION PER HR: HCPCS

## 2023-11-14 PROCEDURE — 82374 ASSAY BLOOD CARBON DIOXIDE: CPT | Performed by: PHYSICIAN ASSISTANT

## 2023-11-14 RX ORDER — RIZATRIPTAN BENZOATE 10 MG/1
10 TABLET ORAL ONCE AS NEEDED
Qty: 9 TABLET | Refills: 0 | Status: SHIPPED | OUTPATIENT
Start: 2023-11-14

## 2023-11-14 RX ADMIN — VALPROATE SODIUM 500 MG: 100 INJECTION, SOLUTION INTRAVENOUS at 00:15

## 2023-11-14 ASSESSMENT — COGNITIVE AND FUNCTIONAL STATUS - GENERAL
MOBILITY SCORE: 23
DAILY ACTIVITIY SCORE: 24
CLIMB 3 TO 5 STEPS WITH RAILING: A LITTLE

## 2023-11-14 ASSESSMENT — ENCOUNTER SYMPTOMS
HEADACHES: 1
GASTROINTESTINAL NEGATIVE: 1
MUSCULOSKELETAL NEGATIVE: 1
PSYCHIATRIC NEGATIVE: 1
ENDOCRINE NEGATIVE: 1
EYES NEGATIVE: 1
ALLERGIC/IMMUNOLOGIC NEGATIVE: 1
CONSTITUTIONAL NEGATIVE: 1
CARDIOVASCULAR NEGATIVE: 1
RESPIRATORY NEGATIVE: 1
HEMATOLOGIC/LYMPHATIC NEGATIVE: 1

## 2023-11-14 NOTE — H&P
History Of Present Illness  Katie Cisse is a 33 y.o. female presenting with c/o headache with aura. ER visit last week with similar symptoms.  Denies neck pain or fever.  She had OP MRI/MRA ordered as well as neurology follow up on Thursday.  Mother and sister both with histories of migraines.  CTH negative.     Past Medical History  History reviewed. No pertinent past medical history.    Surgical History  Past Surgical History:   Procedure Laterality Date    MOUTH SURGERY  11/07/2014    Oral Surgery Tooth Extraction       Social History  Social History     Socioeconomic History    Marital status:      Spouse name: None    Number of children: None    Years of education: None    Highest education level: None   Occupational History    None   Tobacco Use    Smoking status: Never    Smokeless tobacco: Never   Vaping Use    Vaping Use: Never used   Substance and Sexual Activity    Alcohol use: Not Currently    Drug use: Never    Sexual activity: None   Other Topics Concern    None   Social History Narrative    None     Social Determinants of Health     Financial Resource Strain: Low Risk  (11/13/2023)    Overall Financial Resource Strain (CARDIA)     Difficulty of Paying Living Expenses: Not hard at all   Food Insecurity: Not on file   Transportation Needs: No Transportation Needs (11/13/2023)    PRAPARE - Transportation     Lack of Transportation (Medical): No     Lack of Transportation (Non-Medical): No   Physical Activity: Not on file   Stress: Not on file   Social Connections: Not on file   Intimate Partner Violence: Not on file   Housing Stability: Low Risk  (11/13/2023)    Housing Stability Vital Sign     Unable to Pay for Housing in the Last Year: No     Number of Places Lived in the Last Year: 1     Unstable Housing in the Last Year: No        Family History  Family History   Problem Relation Name Age of Onset    Endometriosis Mother      Hypertension Maternal Grandmother      Heart disease Maternal  Grandmother      Diabetes Paternal Grandfather          Allergies  Allergies as of 11/13/2023 - Reviewed 11/13/2023   Allergen Reaction Noted    Vancomycin Unknown, Other, and Rash 09/18/2023    Azithromycin Other 09/18/2023    Codeine Nausea Only and Other 09/18/2023    Penicillins Rash 09/18/2023    Sulfamethoxazole-trimethoprim Itching and Rash 09/18/2023        Review of Systems  Review of Systems   Constitutional: Negative.    HENT: Negative.     Eyes: Negative.    Respiratory: Negative.     Cardiovascular: Negative.    Gastrointestinal: Negative.    Endocrine: Negative.    Genitourinary: Negative.    Musculoskeletal: Negative.    Allergic/Immunologic: Negative.    Neurological:  Positive for headaches.   Hematological: Negative.    Psychiatric/Behavioral: Negative.     All other systems reviewed and are negative.      Relevant results reviewed   PROVIDER notes  Nursing notes  MEDS:  Current Facility-Administered Medications   Medication Dose Route Frequency Provider Last Rate Last Admin    acetaminophen (Tylenol) tablet 975 mg  975 mg oral q6h PRN Mily Naqvi PA-C        docusate sodium (Colace) capsule 100 mg  100 mg oral BID PRN Mily Naqvi PA-C        hydrOXYzine HCL (Atarax) tablet 10 mg  10 mg oral q8h PRN Mily Naqvi PA-C        melatonin tablet 3 mg  3 mg oral Daily Mily Naqvi PA-C        pantoprazole (ProtoNix) EC tablet 40 mg  40 mg oral Daily before breakfast Mily Naqvi PA-C        Or    pantoprazole (ProtoNix) injection 40 mg  40 mg intravenous Daily before breakfast Mily Naqvi PA-C        sertraline (Zoloft) tablet 100 mg  100 mg oral Nightly Mily Naqvi PA-C          LABS:  Results for orders placed or performed during the hospital encounter of 11/13/23 (from the past 24 hour(s))   Comprehensive metabolic panel   Result Value Ref Range    Glucose 93 74 - 99 mg/dL    Sodium 139 136 - 145 mmol/L    Potassium 3.3 (L) 3.5 - 5.3 mmol/L    Chloride 105 98 - 107  mmol/L    Bicarbonate 22 21 - 32 mmol/L    Anion Gap 15 10 - 20 mmol/L    Urea Nitrogen 11 6 - 23 mg/dL    Creatinine 0.95 0.50 - 1.05 mg/dL    eGFR 81 >60 mL/min/1.73m*2    Calcium 9.2 8.6 - 10.3 mg/dL    Albumin 4.0 3.4 - 5.0 g/dL    Alkaline Phosphatase 50 33 - 110 U/L    Total Protein 7.5 6.4 - 8.2 g/dL    AST 14 9 - 39 U/L    Bilirubin, Total 0.5 0.0 - 1.2 mg/dL    ALT 13 7 - 45 U/L   CBC and Auto Differential   Result Value Ref Range    WBC 9.1 4.4 - 11.3 x10*3/uL    nRBC 0.0 0.0 - 0.0 /100 WBCs    RBC 5.22 (H) 4.00 - 5.20 x10*6/uL    Hemoglobin 14.4 12.0 - 16.0 g/dL    Hematocrit 43.1 36.0 - 46.0 %    MCV 83 80 - 100 fL    MCH 27.6 26.0 - 34.0 pg    MCHC 33.4 32.0 - 36.0 g/dL    RDW 12.9 11.5 - 14.5 %    Platelets 256 150 - 450 x10*3/uL    Neutrophils % 39.7 40.0 - 80.0 %    Immature Granulocytes %, Automated 0.1 0.0 - 0.9 %    Lymphocytes % 52.6 13.0 - 44.0 %    Monocytes % 5.8 2.0 - 10.0 %    Eosinophils % 1.3 0.0 - 6.0 %    Basophils % 0.5 0.0 - 2.0 %    Neutrophils Absolute 3.62 1.20 - 7.70 x10*3/uL    Immature Granulocytes Absolute, Automated 0.01 0.00 - 0.70 x10*3/uL    Lymphocytes Absolute 4.81 (H) 1.20 - 4.80 x10*3/uL    Monocytes Absolute 0.53 0.10 - 1.00 x10*3/uL    Eosinophils Absolute 0.12 0.00 - 0.70 x10*3/uL    Basophils Absolute 0.05 0.00 - 0.10 x10*3/uL   Human Chorionic Gonadotropin, Serum Quantitative   Result Value Ref Range    HCG, Beta-Quantitative <2 <5 mIU/mL   Sedimentation Rate   Result Value Ref Range    Sedimentation Rate 12 0 - 20 mm/h   Hepatic function panel   Result Value Ref Range    Albumin 4.0 3.4 - 5.0 g/dL    Bilirubin, Total 0.5 0.0 - 1.2 mg/dL    Bilirubin, Direct 0.1 0.0 - 0.3 mg/dL    Alkaline Phosphatase 50 33 - 110 U/L    ALT 13 7 - 45 U/L    AST 14 9 - 39 U/L    Total Protein 7.5 6.4 - 8.2 g/dL   C-Reactive Protein   Result Value Ref Range    C-Reactive Protein 0.16 <1.00 mg/dL   Basic metabolic panel   Result Value Ref Range    Glucose 93 74 - 99 mg/dL    Sodium 139  "136 - 145 mmol/L    Potassium 3.3 (L) 3.5 - 5.3 mmol/L    Chloride 105 98 - 107 mmol/L    Bicarbonate 22 21 - 32 mmol/L    Anion Gap 15 10 - 20 mmol/L    Urea Nitrogen 11 6 - 23 mg/dL    Creatinine 0.95 0.50 - 1.05 mg/dL    eGFR 81 >60 mL/min/1.73m*2    Calcium 9.2 8.6 - 10.3 mg/dL   Magnesium   Result Value Ref Range    Magnesium 2.00 1.60 - 2.40 mg/dL   hCG, Urine, Qualitative   Result Value Ref Range    HCG, Urine NEGATIVE NEGATIVE   Urinalysis with Reflex Microscopic and Culture   Result Value Ref Range    Color, Urine Colorless (N) Straw, Yellow    Appearance, Urine Clear Clear    Specific Gravity, Urine 1.002 (N) 1.005 - 1.035    pH, Urine 8.0 5.0, 5.5, 6.0, 6.5, 7.0, 7.5, 8.0    Protein, Urine NEGATIVE NEGATIVE mg/dL    Glucose, Urine NEGATIVE NEGATIVE mg/dL    Blood, Urine NEGATIVE NEGATIVE    Ketones, Urine NEGATIVE NEGATIVE mg/dL    Bilirubin, Urine NEGATIVE NEGATIVE    Urobilinogen, Urine <2.0 <2.0 mg/dL    Nitrite, Urine NEGATIVE NEGATIVE    Leukocyte Esterase, Urine NEGATIVE NEGATIVE   Basic metabolic panel   Result Value Ref Range    Glucose 118 (H) 74 - 99 mg/dL    Sodium 140 136 - 145 mmol/L    Potassium 4.4 3.5 - 5.3 mmol/L    Chloride 108 (H) 98 - 107 mmol/L    Bicarbonate 23 21 - 32 mmol/L    Anion Gap 13 10 - 20 mmol/L    Urea Nitrogen 9 6 - 23 mg/dL    Creatinine 0.91 0.50 - 1.05 mg/dL    eGFR 86 >60 mL/min/1.73m*2    Calcium 8.8 8.6 - 10.3 mg/dL   CBC   Result Value Ref Range    WBC 11.1 4.4 - 11.3 x10*3/uL    nRBC 0.0 0.0 - 0.0 /100 WBCs    RBC 5.08 4.00 - 5.20 x10*6/uL    Hemoglobin 14.1 12.0 - 16.0 g/dL    Hematocrit 43.3 36.0 - 46.0 %    MCV 85 80 - 100 fL    MCH 27.8 26.0 - 34.0 pg    MCHC 32.6 32.0 - 36.0 g/dL    RDW 12.9 11.5 - 14.5 %    Platelets 266 150 - 450 x10*3/uL      IMAGING:  No orders to display          PHYSICAL EXAM  /87   Pulse 95   Temp 36.7 °C (98 °F)   Resp 17   Ht 1.676 m (5' 6\")   Wt 70.3 kg (155 lb)   LMP  (LMP Unknown) Comment: Pt on Slynd  SpO2 97%   " BMI 25.02 kg/m²   PHYSICAL EXAM:  GENERAL: Alert, NAD, cooperative  SKIN: Warm and dry.  No suspicious lesions or rashes.  HEENT:  NCAT, PERRLA, EOMI, nonicteric sclera, MMM, neck supple, trachea midline  LUNGS: Unlabored, no significant wheezing, rhonchi, or rales appreciated  CARDS: RRR  GI: Soft, NTND, BS+, no rebound, no guarding   : no garcia, voids independently   MS/Extremities: WWP, distal pulses intact, moves all extremities  NEURO: A&Ox3, grossly nonfocal exam, speech fluent, follows commands, answers questions appropriately, ambulates with steady gait  PSYCH: mood and behavior appropriate    Assessment/Plan:   Principal Problem:    Migraine with aura and without status migrainosus, not intractable  -MRI/MRA brain negative for acute stroke.  NO LVO or clinically significant stenosis appreciated  -HA cocktail: Magnesium, Reglan, valproate IV, IVF --> symptoms improving  -has neurology appt on Thursday  -GI ppx: Protonix  -VTE: Low risk.  Early ambulation.       Total time spent [including but not limited to]: Obtaining and reviewing patient medical records/history, obtaining a separate history,  examining and assessing the patient, providing  and education, placing pertinent orders for labs/tests/medications,  communicating with the patient/family/health team, documenting in the patient's EMR/formulation of this note, independently interpreting results and data, coordinating care:   50 minutes, with greater than 50% spent in personal discussion with patient and/or family    Mily Naqvi PA-C

## 2023-11-14 NOTE — DISCHARGE SUMMARY
Discharge Diagnosis  Migraine with aura and without status migrainosus, not intractable    Issues Requiring Follow-Up  Headache with vision changes/migraine    Discharge Meds     Your medication list        START taking these medications        Instructions Last Dose Given Next Dose Due   rizatriptan 10 mg tablet  Commonly known as: Maxalt      Take 1 tablet (10 mg) by mouth 1 time if needed for migraine. May repeat in 2 hours if unresolved. Do not exceed 30 mg in 24 hours.              CHANGE how you take these medications        Instructions Last Dose Given Next Dose Due   sertraline 100 mg tablet  Commonly known as: Zoloft  What changed: when to take this      TAKE 1 TABLET BY MOUTH EVERY DAY              CONTINUE taking these medications        Instructions Last Dose Given Next Dose Due   aspirin 81 mg chewable tablet      Chew 1 tablet (81 mg) once daily.       hydrOXYzine HCL 10 mg tablet  Commonly known as: Atarax           Slynd 4 mg (28) tablet  Generic drug: drospirenone (contraceptive)      Take one tab daily and skip the placebo pills.                 Where to Get Your Medications        These medications were sent to [facility]/pharmacy #[id number] - [facility] [address]  [address], [facility] [address]      Phone: [phone]   rizatriptan 10 mg tablet         Test Results Pending At Discharge  Pending Labs       Order Current Status    Extra Urine Gray Tube Collected (11/13/23 1638)    Urinalysis with Reflex Microscopic and Culture In process            Hospital Course   Assessment/Plan:   Principal Problem:    Migraine with aura and without status migrainosus, not intractable  -MRI/MRA brain negative for acute stroke.  NO LVO or clinically significant stenosis appreciated  -HA cocktail: Magnesium, Reglan, valproate IV, IVF --> symptoms improving  -has neurology appt on Thursday  -GI ppx: Protonix  -VTE: Low risk.  Early ambulation.      Labs/Testing reviewed    Interdisciplinary team rounding  completed with hospitalist, nurse, TCC    NP discussed plan and lab/testing results with Dr. Vargas    MRI was negative.  Patient symptoms have resolved.  She has an appointment with Dr. Aldridge on Thursday.  Patient would like to go home.  She will be given rizatriptan for breakthrough migraines if needed and follow-up with neurology on Thursday as scheduled.    * 45 minutes total spent on patient's care today; >50% time spent on counseling/coordination of care       Pertinent Physical Exam At Time of Discharge  Physical Exam  General: Vital signs stable, Pt is alert, no acute distress  Eyes: Conjunctiva normal, PERRL, EOMs intact  HENMT: Normocephalic, atraumatic, external ears and nose normal, no scars or masses.  No mastoid tenderness. Trachea is midline. No meningeal signs, negative Kernig and Brudzinski, moves neck freely.  No sinus tenderness  Resp: Respiratory effort is normal, no retractions, no stridor. Lungs CTA, no wheezes or rhonchi  CV: Heart is regular rate and rhythm.   Skin: No evidence of trauma, skin is warm and dry. No rashes, lesions or ulcers.  Skel: full range of motion of upper and lower extremities.   Neuro: Normal gait, CN II-XII intact, no motor or sensory changes.  Psych: Alert and oriented ×3, judgment is appropriate, normal mood and affect    Outpatient Follow-Up  Future Appointments   Date Time Provider Department Center   11/16/2023  4:30 PM Holley Aldridge MD [id number]   11/25/2023  2:15 PM [facility]   11/25/2023  3:00 PM [facility] MRI 1 [facility]         Sydni Colon, APRN-CNP

## 2023-11-14 NOTE — CARE PLAN
Problem: Pain  Goal: Takes deep breaths with improved pain control throughout the shift  Outcome: Progressing  Goal: Turns in bed with improved pain control throughout the shift  Outcome: Progressing  Goal: Walks with improved pain control throughout the shift  Outcome: Progressing  Goal: Performs ADL's with improved pain control throughout shift  Outcome: Progressing  Goal: Participates in PT with improved pain control throughout the shift  Outcome: Progressing  Goal: Free from opioid side effects throughout the shift  Outcome: Progressing  Goal: Free from acute confusion related to pain meds throughout the shift  Outcome: Progressing     Problem: Pain - Adult  Goal: Verbalizes/displays adequate comfort level or baseline comfort level  Outcome: Progressing     Problem: Safety - Adult  Goal: Free from fall injury  Outcome: Progressing     Problem: Discharge Planning  Goal: Discharge to home or other facility with appropriate resources  Outcome: Progressing     Problem: Chronic Conditions and Co-morbidities  Goal: Patient's chronic conditions and co-morbidity symptoms are monitored and maintained or improved  Outcome: Progressing   The patient's goals for the shift include      The clinical goals for the shift include Monitor pt's pain and give medication / comfort measures as needed.

## 2023-11-14 NOTE — CARE PLAN
Problem: Pain  Goal: Takes deep breaths with improved pain control throughout the shift  Outcome: Met     Problem: Pain  Goal: Turns in bed with improved pain control throughout the shift  Outcome: Met     Problem: Pain  Goal: Walks with improved pain control throughout the shift  Outcome: Met     Problem: Pain  Goal: Performs ADL's with improved pain control throughout shift  Outcome: Met     Problem: Pain  Goal: Participates in PT with improved pain control throughout the shift  Outcome: Met   The patient's goals for the shift include get better      The clinical goals for the shift include Monitor pt's pain and give medication / comfort measures as needed.    Over the shift, the patient did not make progress toward the following goals. Barriers to progression include pain and discomfort. Recommendations to address these barriers include continue education.

## 2023-11-14 NOTE — DISCHARGE INSTRUCTIONS
Follow up with your neurologist on Thursday as scheduled.  Breakthrough medications were called into your pharmacy use as directed as needed.  Please let your neurologist know that you already had your MRI and they can cancel the one scheduled for November 25

## 2023-11-15 ENCOUNTER — PATIENT OUTREACH (OUTPATIENT)
Dept: CARE COORDINATION | Facility: CLINIC | Age: 33
End: 2023-11-15
Payer: COMMERCIAL

## 2023-11-15 DIAGNOSIS — G43.109 MIGRAINE WITH AURA AND WITHOUT STATUS MIGRAINOSUS, NOT INTRACTABLE: ICD-10-CM

## 2023-11-15 NOTE — PROGRESS NOTES
Discharge Facility:[facility]  Discharge Diagnosis:G43.109 Migraine with aura and without migrainosus not intractable  Admission Date:11/13/23  Discharge Date:11/14/23    PCP Appointment Date:Task sent to office  Specialist Appointment Date: Neurology 11/16/23  Hospital Encounter and Summary: Linked  See discharge assessment below for further details    Medications  Medications reviewed with patient/caregiver?: Yes (11/15/2023  3:05 PM)  Is the patient having any side effects they believe may be caused by any medication additions or changes?: No (11/15/2023  3:05 PM)  Does the patient have all medications ordered at discharge?: Yes (11/15/2023  3:05 PM)  Care Management Interventions: Provided patient education (11/15/2023  3:05 PM)  Is the patient taking all medications as directed (includes completed medication regime)?: Yes (11/15/2023  3:05 PM)  Care Management Interventions: Provided patient education (11/15/2023  3:05 PM)  Medication Comments: Maxalt (11/15/2023  3:05 PM)    Appointments  Does the patient have a primary care provider?: Yes (11/15/2023  3:05 PM)  Care Management Interventions: Advised patient to make appointment (11/15/2023  3:05 PM)  Has the patient kept scheduled appointments due by today?: Yes (11/15/2023  3:05 PM)    Self Management  Has home health visited the patient within 72 hours of discharge?: Not applicable (11/15/2023  3:05 PM)    Patient Teaching  Does the patient have access to their discharge instructions?: Yes (11/15/2023  3:05 PM)  Care Management Interventions: Reviewed instructions with patient (11/15/2023  3:05 PM)  What is the patient's perception of their health status since discharge?: Improving (11/15/2023  3:05 PM)  Is the patient/caregiver able to teach back the hierarchy of who to call/visit for symptoms/problems? PCP, Specialist, Home Health nurse, Urgent Care, ED, 911: Yes (11/15/2023  3:05 PM)

## 2023-11-16 ENCOUNTER — TELEMEDICINE (OUTPATIENT)
Dept: NEUROLOGY | Facility: HOSPITAL | Age: 33
End: 2023-11-16
Payer: COMMERCIAL

## 2023-11-16 ENCOUNTER — TELEPHONE (OUTPATIENT)
Dept: PRIMARY CARE | Facility: CLINIC | Age: 33
End: 2023-11-16
Payer: COMMERCIAL

## 2023-11-16 DIAGNOSIS — G43.109 MIGRAINE WITH AURA AND WITHOUT STATUS MIGRAINOSUS, NOT INTRACTABLE: Primary | ICD-10-CM

## 2023-11-16 PROCEDURE — 99214 OFFICE O/P EST MOD 30 MIN: CPT | Mod: 95 | Performed by: PSYCHIATRY & NEUROLOGY

## 2023-11-16 PROCEDURE — 99204 OFFICE O/P NEW MOD 45 MIN: CPT | Performed by: PSYCHIATRY & NEUROLOGY

## 2023-11-16 ASSESSMENT — ACTIVITIES OF DAILY LIVING (ADL)
GROOMING: INDEPENDENT FACE/HAIR/TEETH.SHAVING (IMPLEMENTS PROVIDED)
BATHING: INDEPENDENT (OR IN SHOWER)
DRESSING: INDEPENDENT (INCLUDING BUTTONS, ZIPS, LACES,ETC.)
BOWELS: INDEPENDENT (INCLUDING BUTTONS, ZIPS, LACES, ETC.)
FEEDING: INDEPENDENT
MOBILITY_LEVEL_SURFACES: INDEPENDENT (BUT MAY USE ANY AID FOR EXAMPLE, STICK) > 50 YARDS
TOTAL_SCORE: 100
TOILET_USE: INDEPENDENT (ON AND OFF, DRESSING, WIPING)
BED_TO_CHAIR_AND_BACK: INDEPENDENT
BLADDER: CONTINENT
STAIRS: INDEPENDENT

## 2023-11-16 NOTE — TELEPHONE ENCOUNTER
----- Message from Julia Samuels DO sent at 11/16/2023 10:14 AM EST -----  Regarding: RE: TCM appt needed  Ok to have her neuro appt, we can call and offer her one here, but that is kou,  Thank you,  Julia Samuels DO    ----- Message -----  From: Camille Madrid  Sent: 11/16/2023  10:08 AM EST  To: Julia Samuels DO  Subject: FW: TCM appt needed                              Please see message below. Do you need to see the patient for a ER visit? Also pts MRI of the brain and head are completed and in pts chart. Pt also has an appt with neurology today Dr. Holley Coyne. Please advise    ----- Message -----  From: Janelle Ortiz LPN  Sent: 11/15/2023   3:04 PM EST  To:  [place] [address] [facility]  Subject: TCM appt needed                                  This patient was discharged from:[facility]  Discharge diagnosis:Migraine with aura and without status migrainosus not intractable  Date of discharge:11/14/23     No PCP appointments available within 14 days of discharge. Please reach out to patient and schedule an appointment within 7-13 days from discharge date.

## 2023-11-16 NOTE — PROGRESS NOTES
[facility]   Katie Cisse is a 33 y.o. year old female presenting for initial evaluation .     History of background headaches, occipital and radiating to vertex, flare in adolescence several times per month.  Chiropractic therapy for neck pain in youth, during pregnancy 2022, not in last year.  Not with focal symptoms.  FH+ mother with migraine with visual aura, sister with headaches only.      11/16/23- Consult for recent ER evaluation for possible TIA.  11/6 Presented to [facility] as stroke alert, reported severe headache with evolving L visual field deficit then migrating L sided numbness and difficulty speaking.  Symptoms resolved during evaluation.  DC on aspirin, migraine vs TIA.   11/13 Returned to ER with a similar event, lasted ~3 hours, followup MRI / MRA also normal.      Today- States headaches were mild- she ws given headache cocktail- reglan, valproate, magnesium, IF fluids with improvement.  States headache is not the major concern it was the inability to function during the event, feels she couldn't have been able to work if happened there.   Not pregnant, no changes in hormonal therapy, only recent changes to lifestyle is dietary changes with intermittent fasting.  Caffeine consumption- 1-2 servings of soda daily.  Poor sleep last several months.     Extensive review of notes in EMR, labs, tests, Interpretation of neuroimaging  2022- CT chest, LE US- normal, postpartum  11/2023- Labs unremarkable. CT- no acute findings.  CTA no LVO or vascular stenosis.     MRI DWI normal, MRA head/neck- normal.     Relevant ROS, Problem list, Past Medical/ Surgical/ Family/ Social history- reviewed and pertinent details noted in history.     Objective     Visit Vitals  LMP  (LMP Unknown) Comment: Pt on Slynd   OB Status Having periods   Smoking Status Never         modified Kay Score Modified Kay (mRS) Modified [place] Score: No symptoms.   Barthel Index of Activities of  Daily Living Barthel Index  Feeding: independent  Bathing: independent (or in shower)  Grooming: independent face/hair/teeth.shaving (implements provided)  Dressing : independent (including buttons, zips, laces,etc.)  Bowels: independent (including buttons, zips, laces, etc.)  Bladder: continent  Toilet Use : independent (on and off, dressing, wiping)  Transfers (Bed to chair and back) : independent  Mobility (On level surfaces): independent (but may use any aid for example, stick) > 50 yards  Stairs: independent  Total (0-100): 100     General appearance- in no acute distress.   Mental status: alert, interactive and cooperative with an appropriate affect.    Speech is clear.  Language intact for comprehension, expression, and vocabulary.        Assessment/Plan   1. Migraine with aura and without status migrainosus, not intractable      Reviewed- if these become frequent would need preventive medication - like calcium channel blocker- as its the aura that bothers her the most. Discussed mgt of headaches- which she feels are not a major issue, recommend taking some OTC medication at onset, if suspects a significant headache or not responding then use the triptan.  Clarified that the medications do not shorten the aura, they have their impact on reducing severity of following headache.   No orders of the defined types were placed in this encounter.

## 2023-11-20 ENCOUNTER — OFFICE VISIT (OUTPATIENT)
Dept: PRIMARY CARE | Facility: CLINIC | Age: 33
End: 2023-11-20
Payer: COMMERCIAL

## 2023-11-20 VITALS
WEIGHT: 151 LBS | TEMPERATURE: 98.5 F | SYSTOLIC BLOOD PRESSURE: 102 MMHG | DIASTOLIC BLOOD PRESSURE: 58 MMHG | BODY MASS INDEX: 24.37 KG/M2

## 2023-11-20 DIAGNOSIS — G43.109 MIGRAINE WITH AURA AND WITHOUT STATUS MIGRAINOSUS, NOT INTRACTABLE: Primary | ICD-10-CM

## 2023-11-20 PROCEDURE — 1036F TOBACCO NON-USER: CPT | Performed by: FAMILY MEDICINE

## 2023-11-20 PROCEDURE — 99213 OFFICE O/P EST LOW 20 MIN: CPT | Performed by: FAMILY MEDICINE

## 2023-11-20 ASSESSMENT — PATIENT HEALTH QUESTIONNAIRE - PHQ9
SUM OF ALL RESPONSES TO PHQ9 QUESTIONS 1 AND 2: 0
1. LITTLE INTEREST OR PLEASURE IN DOING THINGS: NOT AT ALL
2. FEELING DOWN, DEPRESSED OR HOPELESS: NOT AT ALL

## 2023-11-20 NOTE — PROGRESS NOTES
Subjective   Patient ID: Katie Cisse is a 33 y.o. female who presents for No chief complaint on file..    Pt presents for follow up:     She saw neuro virtually  She has a headache this AM  Lack of sleep due to her daughter not sleeping last night   She is still takihg ASA81 mg daily       Jan appt with neuro          Review of Systems    Objective   LMP  (LMP Unknown) Comment: Pt on Slynd    Physical Exam  Vitals and nursing note reviewed.   Constitutional:       Appearance: Normal appearance.   Cardiovascular:      Rate and Rhythm: Normal rate and regular rhythm.      Heart sounds: Normal heart sounds.   Pulmonary:      Effort: Pulmonary effort is normal.      Breath sounds: Normal breath sounds. Rhonchi: brandon.   Neurological:      General: No focal deficit present.      Mental Status: She is alert and oriented to person, place, and time. Mental status is at baseline.      Cranial Nerves: No cranial nerve deficit.   Psychiatric:         Mood and Affect: Mood normal.         Assessment/Plan   Diagnoses and all orders for this visit:  Migraine with aura and without status migrainosus, not intractable    Plan per neuro continue with ASA 81 mg daily  PRN Triptan  Advised pt to send a [employer] message as needed    Julia Samuels,

## 2023-11-25 ENCOUNTER — APPOINTMENT (OUTPATIENT)
Dept: RADIOLOGY | Facility: HOSPITAL | Age: 33
End: 2023-11-25
Payer: COMMERCIAL

## 2023-11-29 ENCOUNTER — PATIENT OUTREACH (OUTPATIENT)
Dept: CARE COORDINATION | Facility: CLINIC | Age: 33
End: 2023-11-29
Payer: COMMERCIAL

## 2023-11-29 NOTE — PROGRESS NOTES
Unable to reach patient for call back after patient's follow up appointment with PCP.   JIMM with call back number for patient to call if needed   If no voicemail available call attempts x 2 were made to contact the patient to assist with any questions or concerns patient may have.

## 2023-12-04 NOTE — ED PROVIDER NOTES
HPI   Chief Complaint   Patient presents with   • Migraine       HPI: []  33-year-old white female today comes in with a headache and vision loss.  Seen in the ED last week for similar symptoms workup was negative CT head CT angio negative was diagnosed with complex migraines discharged home scheduled to see neurology schedule outpatient MRI MRA missed appointment today returns with a headache which came on with left-sided vision loss.  Had some slurred speech.  Denies paresthesias numbness tingling weakness of the upper or lower extremities headache not worst of life it is not a thunderclap headache.  She denies associated nausea vomit diarrhea fever chills cough congestion incontinence seizures syncope or near syncope.  Denies hematemesis melena medic is denies hemoptysis.  No recent travel or hospitalizations.    Past history: Questionable migraines  Social: Patient denies current tobacco alcohol drug abuse.  REVIEW OF SYSTEMS:    GENERAL.: No weight loss, fatigue, anorexia, insomnia, fever.  Positive headache    EYES: No vision loss, double vision, drainage, eye pain.    ENT: No pharyngitis, dry mouth.    CARDIOPULMONARY: No chest pain, palpitations, syncope, near syncope. No shortness of breath, cough, hemoptysis.    GI: No abdominal pain, change in bowel habits, melena, hematemesis, hematochezia, nausea, vomiting, diarrhea.    : No discharge, dysuria, frequency, urgency, hematuria.    MS: No limb pain, joint pain, joint swelling.    SKIN: No rashes.  Neuro: Positive transient vision loss  PSYCH: No depression, anxiety, suicidality, homicidality.    Review of systems is otherwise negative unless stated above or in history of present illness.  Social history, family history, allergies reviewed.  PHYSICAL EXAM:    GENERAL: Vitals noted, no distress. Alert and oriented  x 3. Non-toxic.      EENT: TMs clear. Posterior oropharynx unremarkable. No meningismus. No LAD.   Eyes: Pupils are equal round and reactive  accommodation EOMs are intact  NECK: Supple. Nontender. No midline tenderness.     CARDIAC: Regular, rate, rhythm. No murmurs rubs or gallops. No JVD    PULMONARY: Lungs clear bilaterally with good aeration. No wheezes rales or rhonchi. No respiratory distress.     ABDOMEN: Soft, nonsurgical. Nontender. No peritoneal signs. Normoactive bowel sounds. No pulsatile masses.     EXTREMITIES: No peripheral edema. Negative Homans bilaterally, no cords.  2+ bounding pulses well-perfused  SKIN: No rash. Intact.     NEURO: No focal neurologic deficits, NIH score of 0. Cranial nerves normal as tested from II through XII.     MEDICAL DECISION MAKING:  EKG on my interpretation shows a normal sinus rhythm normal axis rate mid 80s with no acute ischemic change.    CBC with differential chemistry LFTs are unremarkable ESR CRP is normal given normal neuro examination and complete resolution of symptoms and the fact she had negative CT angio last week CT head was deferred we will perform an MRI/MRA.    ED course: Patient remained stable hemodynamic.    Impression: #1 headache most likely migraine, #2 transient vision loss    Plans and MDM: 30-year-old female today comes in with headache and vision loss which is back to normal now stroke scale is 0 low suspicion for stroke or TIA.  Possibly complex migraine low suspicion for meningitis encephalitis patient will be hospitalized for inpatient neurology workup and MRI of the brain                                No data recorded                Patient History   Past Medical History:   Diagnosis Date   • Depression 08-15-22   • Migraine 11/06/23     Past Surgical History:   Procedure Laterality Date   • MOUTH SURGERY  11/07/2014    Oral Surgery Tooth Extraction   • MR HEAD ANGIO WO IV CONTRAST  11/13/2023    MR HEAD ANGIO WO IV CONTRAST 11/13/2023 [facility] MRI     Family History   Problem Relation Name Age of Onset   • Endometriosis Mother Whitney Gaston    • Migraines Mother Whitney Gaston    •  Hypertension Maternal Grandmother Mariella Gaston    • Heart disease Maternal Grandmother Mariella Gaston    • Parkinsonism Maternal Grandmother Mariella Gaston    • Diabetes Paternal Grandfather Aidan Garrido    • Dementia Paternal Grandfather Aidan Garrido    • Anxiety disorder Sister Melany Garrido    • Depression Father's Sister Casi Garrido      Social History     Tobacco Use   • Smoking status: Never   • Smokeless tobacco: Never   Vaping Use   • Vaping Use: Never used   Substance Use Topics   • Alcohol use: Yes     Alcohol/week: 1.0 standard drink of alcohol     Types: 1 Glasses of wine per week   • Drug use: Never       Physical Exam   ED Triage Vitals   Temp Heart Rate Resp BP   11/13/23 1609 11/13/23 1609 11/13/23 1609 11/13/23 1609   36.7 °C (98 °F) 102 20 125/82      SpO2 Temp Source Heart Rate Source Patient Position   11/13/23 1609 11/13/23 2059 11/14/23 0747 11/14/23 0020   99 % Oral Monitor Lying      BP Location FiO2 (%)     11/14/23 0747 --     Left arm        Physical Exam    ED Course & MDM   ED Course as of 12/03/23 1930 Mon Nov 13, 2023   1800 Patient remains neurologically intact stroke scale 0 will be hospitalized for further neurology workup and MRI of the brain [MT]      ED Course User Index  [MT] Aamir Oconnell MD         Diagnoses as of 12/03/23 1930   Ophthalmoplegic migraine, not intractable       Medical Decision Making      Procedure  Procedures     Aamir Oconnell MD  12/03/23 1931

## 2023-12-21 ENCOUNTER — APPOINTMENT (OUTPATIENT)
Dept: OBSTETRICS AND GYNECOLOGY | Facility: CLINIC | Age: 33
End: 2023-12-21
Payer: COMMERCIAL

## 2023-12-29 ENCOUNTER — PATIENT OUTREACH (OUTPATIENT)
Dept: CARE COORDINATION | Facility: CLINIC | Age: 33
End: 2023-12-29
Payer: COMMERCIAL

## 2024-01-08 ENCOUNTER — TELEMEDICINE (OUTPATIENT)
Dept: NEUROLOGY | Facility: CLINIC | Age: 34
End: 2024-01-08
Payer: COMMERCIAL

## 2024-01-08 DIAGNOSIS — G43.109 MIGRAINE WITH AURA AND WITHOUT STATUS MIGRAINOSUS, NOT INTRACTABLE: Primary | ICD-10-CM

## 2024-01-08 PROCEDURE — 99214 OFFICE O/P EST MOD 30 MIN: CPT | Performed by: NURSE PRACTITIONER

## 2024-01-08 NOTE — PROGRESS NOTES
Patient being assessed today for initial evaluation of complex migraine.  Patient reports that in November she had 2 separate episodes where she started to experience an aura in her right eye and then shortly after that started to experience slurred speech followed by hemiparesis.  The first episode only the left side was affected and lasted for about an hour.  The second episode affected both sides and lasted for about 3 hours.  Each time she did go to the ER for evaluation.  All of her scans were unremarkableAnd she was diagnosed with complex migraines.  She does have a family history with her mother and half-sister experiencing migraines as well.  She denies any further episodes.  Denies any head pain or either of the episodes.  Denies history of headaches or migraines for herself as well as never had episodes like this previously.  Denies nausea.  Denies vomiting.  Denies photophobia.  Denies phonophobia.  Endorses aura.  Endorses speech and language deficits.  They did give her a prescription for rizatriptan which she has not had to utilize at.  Will continue to monitor patient and patient is to utilize the rizatriptan if she starts to have the aura change again.  Patient verbalized understanding.  Discussed role of medicine, importance of taking medications, potential risks, benefits, and precautions to be taken.  Reviewed sleep hygiene and dietary modifications.  Follow-up in 6 months.  Will follow-up sooner if she has any repeat episodes.  Patient verbalized understanding.    This note was created with voice recognition software and was not corrected for typographical or grammatical errors

## 2024-01-25 ENCOUNTER — PATIENT OUTREACH (OUTPATIENT)
Dept: CARE COORDINATION | Facility: CLINIC | Age: 34
End: 2024-01-25
Payer: COMMERCIAL

## 2024-02-15 ENCOUNTER — PATIENT MESSAGE (OUTPATIENT)
Dept: BEHAVIORAL HEALTH | Facility: CLINIC | Age: 34
End: 2024-02-15
Payer: COMMERCIAL

## 2024-02-15 RX ORDER — SERTRALINE HYDROCHLORIDE 100 MG/1
100 TABLET, FILM COATED ORAL DAILY
Qty: 90 TABLET | Refills: 0 | OUTPATIENT
Start: 2024-02-15

## 2024-02-16 RX ORDER — SERTRALINE HYDROCHLORIDE 100 MG/1
100 TABLET, FILM COATED ORAL DAILY
Qty: 14 TABLET | Refills: 0 | Status: SHIPPED | OUTPATIENT
Start: 2024-02-16 | End: 2024-02-21 | Stop reason: SDUPTHER

## 2024-02-21 ENCOUNTER — TELEMEDICINE (OUTPATIENT)
Dept: BEHAVIORAL HEALTH | Facility: CLINIC | Age: 34
End: 2024-02-21
Payer: COMMERCIAL

## 2024-02-21 PROCEDURE — 1036F TOBACCO NON-USER: CPT | Performed by: CLINICAL NURSE SPECIALIST

## 2024-02-21 PROCEDURE — 99213 OFFICE O/P EST LOW 20 MIN: CPT | Performed by: CLINICAL NURSE SPECIALIST

## 2024-02-21 RX ORDER — SERTRALINE HYDROCHLORIDE 100 MG/1
100 TABLET, FILM COATED ORAL DAILY
Qty: 90 TABLET | Refills: 1 | Status: SHIPPED | OUTPATIENT
Start: 2024-02-21 | End: 2025-02-20
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
## 2024-02-22 ENCOUNTER — APPOINTMENT (OUTPATIENT)
Dept: BEHAVIORAL HEALTH | Facility: CLINIC | Age: 34
End: 2024-02-22
Payer: COMMERCIAL

## 2024-06-20 DIAGNOSIS — G43.109 MIGRAINE WITH AURA AND WITHOUT STATUS MIGRAINOSUS, NOT INTRACTABLE: ICD-10-CM

## 2024-06-20 RX ORDER — RIZATRIPTAN BENZOATE 10 MG/1
10 TABLET ORAL ONCE AS NEEDED
Qty: 9 TABLET | Refills: 3 | Status: SHIPPED | OUTPATIENT
Start: 2024-06-20

## 2024-06-25 RX ORDER — SERTRALINE HYDROCHLORIDE 100 MG/1
100 TABLET, FILM COATED ORAL DAILY
Qty: 90 TABLET | Refills: 0 | Status: SHIPPED | OUTPATIENT
Start: 2024-06-25

## 2024-06-27 ENCOUNTER — OFFICE VISIT (OUTPATIENT)
Dept: OBSTETRICS AND GYNECOLOGY | Facility: CLINIC | Age: 34
End: 2024-06-27
Payer: COMMERCIAL

## 2024-06-27 DIAGNOSIS — N93.9 ABNORMAL UTERINE BLEEDING (AUB): ICD-10-CM

## 2024-06-27 PROCEDURE — 99212 OFFICE O/P EST SF 10 MIN: CPT | Performed by: OBSTETRICS & GYNECOLOGY

## 2024-06-27 NOTE — PROGRESS NOTES
Patient here for sample as the pharmacy will only give her a 3 packs even though she is skipping the 4 placebo pills.  I resent the script with notes to the pharmacy to dispense a FULL 90 day supply, 4 packs with 4 refills.  Patient will use this sample until able to  from the pharmacy.

## 2024-08-02 RX ORDER — DOXYCYCLINE 100 MG/1
CAPSULE ORAL
COMMUNITY
Start: 2024-03-17

## 2024-08-02 RX ORDER — BENZONATATE 100 MG/1
CAPSULE ORAL
COMMUNITY
Start: 2024-03-20

## 2024-08-07 ENCOUNTER — TELEMEDICINE (OUTPATIENT)
Dept: BEHAVIORAL HEALTH | Facility: CLINIC | Age: 34
End: 2024-08-07
Payer: COMMERCIAL

## 2024-08-07 DIAGNOSIS — F41.1 GAD (GENERALIZED ANXIETY DISORDER): ICD-10-CM

## 2024-08-07 PROCEDURE — 99213 OFFICE O/P EST LOW 20 MIN: CPT | Performed by: CLINICAL NURSE SPECIALIST

## 2024-08-07 RX ORDER — SERTRALINE HYDROCHLORIDE 100 MG/1
100 TABLET, FILM COATED ORAL DAILY
Qty: 90 TABLET | Refills: 1 | Status: SHIPPED | OUTPATIENT
Start: 2024-08-07

## 2024-08-07 RX ORDER — HYDROXYZINE HYDROCHLORIDE 10 MG/1
10 TABLET, FILM COATED ORAL EVERY 8 HOURS PRN
Qty: 30 TABLET | Refills: 0 | Status: SHIPPED | OUTPATIENT
Start: 2024-08-07 | End: 2024-09-06
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
## 2024-08-19 ENCOUNTER — TELEPHONE (OUTPATIENT)
Dept: OBSTETRICS AND GYNECOLOGY | Facility: CLINIC | Age: 34
End: 2024-08-19
Payer: COMMERCIAL

## 2024-08-19 NOTE — TELEPHONE ENCOUNTER
ES sent PA request for slynd. Pt takes it continuously. PA initiated voa CMPADILLA. Key: [id number]  Your information has been sent to [employer].  Outcome  Approved today by [employer]  CaseId:[id number];Status:Approved;Review Type:Prior Auth;Coverage Start Date:07/20/2024;Coverage End Date:08/19/2025;  Authorization Expiration Date: 8/18/2025

## 2024-09-23 ENCOUNTER — APPOINTMENT (OUTPATIENT)
Dept: NEUROLOGY | Facility: CLINIC | Age: 34
End: 2024-09-23
Payer: COMMERCIAL

## 2024-09-23 DIAGNOSIS — G43.109 MIGRAINE WITH AURA AND WITHOUT STATUS MIGRAINOSUS, NOT INTRACTABLE: ICD-10-CM

## 2024-09-23 PROCEDURE — 99214 OFFICE O/P EST MOD 30 MIN: CPT | Performed by: NURSE PRACTITIONER

## 2024-09-23 RX ORDER — RIZATRIPTAN BENZOATE 10 MG/1
10 TABLET ORAL AS NEEDED
Qty: 9 TABLET | Refills: 3 | Status: SHIPPED | OUTPATIENT
Start: 2024-09-23

## 2024-09-23 NOTE — PROGRESS NOTES
Patient being assessed today for follow-up of migraine.  She reports that since her last appointment she has had 1 migraine in June.  The rizatriptan did help some of her symptoms although she did not take it immediately at the onset of the migraine.  Suspect that it would be more effective if she is able to take it sooner.  Despite that, patient reports that it was more manageable for her with taking the rizatriptan.  Denies any side effects.  Will continue the rizatriptan as she has been taking it.  Discussed role of medicine, importance of taking medications, potential risks, benefits, and precautions to be taken.  Reviewed sleep hygiene and dietary modifications.  Follow-up in 1 year or sooner if needed.    This note was created with voice recognition software and was not corrected for typographical or grammatical errors

## 2024-11-12 DIAGNOSIS — N93.9 ABNORMAL UTERINE BLEEDING (AUB): ICD-10-CM

## 2024-11-12 RX ORDER — DROSPIRENONE 4 MG/1
TABLET, FILM COATED ORAL
Qty: 84 TABLET | Refills: 4 | Status: SHIPPED | OUTPATIENT
Start: 2024-11-12

## 2024-12-31 ENCOUNTER — APPOINTMENT (OUTPATIENT)
Dept: BEHAVIORAL HEALTH | Facility: CLINIC | Age: 34
End: 2024-12-31

## 2025-01-29 ENCOUNTER — APPOINTMENT (OUTPATIENT)
Dept: BEHAVIORAL HEALTH | Facility: CLINIC | Age: 35
End: 2025-01-29
Payer: COMMERCIAL

## 2025-02-18 ENCOUNTER — PATIENT MESSAGE (OUTPATIENT)
Dept: OBSTETRICS AND GYNECOLOGY | Facility: CLINIC | Age: 35
End: 2025-02-18

## 2025-02-18 DIAGNOSIS — N93.9 ABNORMAL UTERINE BLEEDING (AUB): ICD-10-CM

## 2025-02-18 RX ORDER — DROSPIRENONE 4 MG/1
TABLET, FILM COATED ORAL
Qty: 84 TABLET | Refills: 4 | Status: SHIPPED | OUTPATIENT
Start: 2025-02-18 | End: 2025-02-19 | Stop reason: SDUPTHER

## 2025-02-19 DIAGNOSIS — N93.9 ABNORMAL UTERINE BLEEDING (AUB): ICD-10-CM

## 2025-02-19 RX ORDER — DROSPIRENONE 4 MG/1
TABLET, FILM COATED ORAL
Qty: 84 TABLET | Refills: 4 | Status: SHIPPED | OUTPATIENT
Start: 2025-02-19 | End: 2025-02-19 | Stop reason: SDUPTHER

## 2025-02-19 RX ORDER — DROSPIRENONE 4 MG/1
TABLET, FILM COATED ORAL
Qty: 84 TABLET | Refills: 4 | OUTPATIENT
Start: 2025-02-19

## 2025-02-20 RX ORDER — DROSPIRENONE 4 MG/1
TABLET, FILM COATED ORAL
Qty: 84 TABLET | Refills: 3 | Status: SHIPPED | OUTPATIENT
Start: 2025-02-20

## 2025-03-18 RX ORDER — SERTRALINE HYDROCHLORIDE 100 MG/1
100 TABLET, FILM COATED ORAL DAILY
Qty: 90 TABLET | Refills: 1 | OUTPATIENT
Start: 2025-03-18

## 2025-03-25 ENCOUNTER — APPOINTMENT (OUTPATIENT)
Dept: BEHAVIORAL HEALTH | Facility: CLINIC | Age: 35
End: 2025-03-25
Payer: COMMERCIAL

## 2025-03-25 PROCEDURE — 99213 OFFICE O/P EST LOW 20 MIN: CPT | Performed by: CLINICAL NURSE SPECIALIST

## 2025-03-25 RX ORDER — SERTRALINE HYDROCHLORIDE 100 MG/1
100 TABLET, FILM COATED ORAL DAILY
Qty: 90 TABLET | Refills: 1 | Status: SHIPPED | OUTPATIENT
Start: 2025-03-25
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
## 2025-09-02 ENCOUNTER — TELEPHONE (OUTPATIENT)
Dept: BEHAVIORAL HEALTH | Facility: CLINIC | Age: 35
End: 2025-09-02

## 2025-09-02 ENCOUNTER — APPOINTMENT (OUTPATIENT)
Dept: BEHAVIORAL HEALTH | Facility: CLINIC | Age: 35
End: 2025-09-02
Payer: COMMERCIAL